# Patient Record
Sex: FEMALE | Race: WHITE | NOT HISPANIC OR LATINO | URBAN - METROPOLITAN AREA
[De-identification: names, ages, dates, MRNs, and addresses within clinical notes are randomized per-mention and may not be internally consistent; named-entity substitution may affect disease eponyms.]

---

## 2017-01-13 ENCOUNTER — OUTPATIENT (OUTPATIENT)
Dept: OUTPATIENT SERVICES | Facility: HOSPITAL | Age: 82
LOS: 1 days | End: 2017-01-13
Payer: COMMERCIAL

## 2017-01-13 DIAGNOSIS — Z98.89 OTHER SPECIFIED POSTPROCEDURAL STATES: Chronic | ICD-10-CM

## 2017-01-17 ENCOUNTER — OUTPATIENT (OUTPATIENT)
Dept: OUTPATIENT SERVICES | Facility: HOSPITAL | Age: 82
LOS: 1 days | End: 2017-01-17
Payer: MEDICARE

## 2017-01-17 DIAGNOSIS — Z98.89 OTHER SPECIFIED POSTPROCEDURAL STATES: Chronic | ICD-10-CM

## 2017-01-17 PROCEDURE — 88321 CONSLTJ&REPRT SLD PREP ELSWR: CPT

## 2017-01-17 PROCEDURE — 93010 ELECTROCARDIOGRAM REPORT: CPT

## 2017-01-19 ENCOUNTER — RESULT REVIEW (OUTPATIENT)
Age: 82
End: 2017-01-19

## 2017-01-20 ENCOUNTER — INPATIENT (INPATIENT)
Facility: HOSPITAL | Age: 82
LOS: 0 days | Discharge: HOME | DRG: 581 | End: 2017-01-21
Attending: SURGERY | Admitting: SURGERY
Payer: COMMERCIAL

## 2017-01-20 DIAGNOSIS — N81.10 CYSTOCELE, UNSPECIFIED: ICD-10-CM

## 2017-01-20 DIAGNOSIS — E78.5 HYPERLIPIDEMIA, UNSPECIFIED: ICD-10-CM

## 2017-01-20 DIAGNOSIS — C50.912 MALIGNANT NEOPLASM OF UNSPECIFIED SITE OF LEFT FEMALE BREAST: ICD-10-CM

## 2017-01-20 DIAGNOSIS — Z17.0 ESTROGEN RECEPTOR POSITIVE STATUS [ER+]: ICD-10-CM

## 2017-01-20 DIAGNOSIS — Z98.89 OTHER SPECIFIED POSTPROCEDURAL STATES: Chronic | ICD-10-CM

## 2017-01-20 DIAGNOSIS — I10 ESSENTIAL (PRIMARY) HYPERTENSION: ICD-10-CM

## 2017-01-20 DIAGNOSIS — M19.90 UNSPECIFIED OSTEOARTHRITIS, UNSPECIFIED SITE: ICD-10-CM

## 2017-01-20 DIAGNOSIS — C50.911 MALIGNANT NEOPLASM OF UNSPECIFIED SITE OF RIGHT FEMALE BREAST: ICD-10-CM

## 2017-01-20 DIAGNOSIS — M85.80 OTHER SPECIFIED DISORDERS OF BONE DENSITY AND STRUCTURE, UNSPECIFIED SITE: ICD-10-CM

## 2017-01-20 PROCEDURE — 88307 TISSUE EXAM BY PATHOLOGIST: CPT | Mod: 26

## 2017-01-20 PROCEDURE — 88360 TUMOR IMMUNOHISTOCHEM/MANUAL: CPT | Mod: 26

## 2017-01-21 PROCEDURE — 85025 COMPLETE CBC W/AUTO DIFF WBC: CPT

## 2017-01-21 PROCEDURE — G0463: CPT

## 2017-01-21 PROCEDURE — 86900 BLOOD TYPING SEROLOGIC ABO: CPT

## 2017-01-21 PROCEDURE — 36415 COLL VENOUS BLD VENIPUNCTURE: CPT

## 2017-01-21 PROCEDURE — 88307 TISSUE EXAM BY PATHOLOGIST: CPT

## 2017-01-21 PROCEDURE — 80048 BASIC METABOLIC PNL TOTAL CA: CPT

## 2017-01-21 PROCEDURE — 88360 TUMOR IMMUNOHISTOCHEM/MANUAL: CPT

## 2017-01-21 PROCEDURE — 86850 RBC ANTIBODY SCREEN: CPT

## 2017-01-21 PROCEDURE — 93005 ELECTROCARDIOGRAM TRACING: CPT

## 2017-12-19 ENCOUNTER — APPOINTMENT (OUTPATIENT)
Dept: ORTHOPEDIC SURGERY | Facility: CLINIC | Age: 82
End: 2017-12-19
Payer: MEDICARE

## 2017-12-19 PROCEDURE — 99213 OFFICE O/P EST LOW 20 MIN: CPT

## 2017-12-19 PROCEDURE — 72170 X-RAY EXAM OF PELVIS: CPT

## 2018-07-24 ENCOUNTER — APPOINTMENT (OUTPATIENT)
Dept: ORTHOPEDIC SURGERY | Facility: CLINIC | Age: 83
End: 2018-07-24
Payer: MEDICARE

## 2018-07-24 VITALS
WEIGHT: 153 LBS | DIASTOLIC BLOOD PRESSURE: 81 MMHG | HEART RATE: 67 BPM | SYSTOLIC BLOOD PRESSURE: 130 MMHG | BODY MASS INDEX: 24.59 KG/M2 | HEIGHT: 66 IN

## 2018-07-24 PROCEDURE — 73522 X-RAY EXAM HIPS BI 3-4 VIEWS: CPT

## 2018-07-24 PROCEDURE — 99213 OFFICE O/P EST LOW 20 MIN: CPT

## 2019-01-08 ENCOUNTER — APPOINTMENT (OUTPATIENT)
Dept: ORTHOPEDIC SURGERY | Facility: CLINIC | Age: 84
End: 2019-01-08
Payer: MEDICARE

## 2019-01-08 VITALS — SYSTOLIC BLOOD PRESSURE: 115 MMHG | DIASTOLIC BLOOD PRESSURE: 79 MMHG | HEART RATE: 75 BPM

## 2019-01-08 PROCEDURE — 99213 OFFICE O/P EST LOW 20 MIN: CPT

## 2019-01-08 NOTE — DISCUSSION/SUMMARY
[de-identified] : She does have significant osteoarthritis of her right hip.  She is managing with it and not having a major amount of pain.  Her left total hip replacement is doing very well.  She is managing well.  She will return in approximately 1 year but if she has any increased symptoms in her right hip she will return sooner.

## 2019-01-08 NOTE — PHYSICAL EXAM
[FreeTextEntry2] : this patient is doing very well with her left hip.\par \par The patient is doing a very well with her left total hip replacement her motion today shows flexion right hip 100 degrees left hip 120 degrees, abduction right hip 20 degrees left hip 60 degrees, adduction right hip 10 degrees left hip 20 degrees, external rotation right hip 20 degrees left hip 20 degrees, internal rotation right hip -10 degrees left hip 15 degrees.  She does have osteoarthritis now on her right hip but she feels that she is managing and not having a major amount of pain.  She would like to stay on conservative measures.  She has no problem with her left total hip replacement. \par \par

## 2019-01-08 NOTE — HISTORY OF PRESENT ILLNESS
[FreeTextEntry1] : L THR [FreeTextEntry2] : Pt is an 84 y/o female s/p L THR 1/9/15 doing well.  She is not having pain in her left hip.  She has no complaints about the left hip.  She continues to have intermittent pain in her right hip.  It is stiff.  She has some pain when she stands from sitting but it improves as she walks. [de-identified] : Pt is an 81 y/o female c/o right anterior thigh pain.  She states that the pain is worse when she sits on the couch at night.  When she stands up and walks the pain improves. Denies groin pain.  There are no other exacerbating factors.  \par She is s/p L THR 1/9/15 which is doing well.

## 2020-01-07 ENCOUNTER — APPOINTMENT (OUTPATIENT)
Dept: ORTHOPEDIC SURGERY | Facility: CLINIC | Age: 85
End: 2020-01-07
Payer: MEDICARE

## 2020-01-07 DIAGNOSIS — M16.11 UNILATERAL PRIMARY OSTEOARTHRITIS, RIGHT HIP: ICD-10-CM

## 2020-01-07 DIAGNOSIS — M17.12 UNILATERAL PRIMARY OSTEOARTHRITIS, LEFT KNEE: ICD-10-CM

## 2020-01-07 PROCEDURE — 99213 OFFICE O/P EST LOW 20 MIN: CPT | Mod: 25

## 2020-01-07 PROCEDURE — 20610 DRAIN/INJ JOINT/BURSA W/O US: CPT | Mod: LT

## 2020-01-07 PROCEDURE — 73522 X-RAY EXAM HIPS BI 3-4 VIEWS: CPT

## 2020-01-07 NOTE — PROCEDURE
[de-identified] : Procedure Note:\par \par Anatomic Location:  Left Knee\par \par Diagnosis:  Arthritis\par \par Procedure:  Injection of 2cc  of Marcaine 0.25% plain and Celestone 1cc, 6mg\par \par Local Spray: Ethyl Chloride.\par \par \par Patient has consented for the procedure.\par \par Injection  through a lateral parapatella approach.\par \par Patient tolerated the procedure well.\par \par Patient instructed to call the office if any reaction, fever, chills, increased erythema or swelling.   [phone].

## 2020-01-07 NOTE — PHYSICAL EXAM
[de-identified] : An AP of the pelvis and a lateral of the right and left hips shows a left hip as a Biomet all porous total hip replacement in good position and well fixed.  Her right hip shows near medial degenerative osteoarthritis no major change from before but there is bone against bone contact medially she is not having pain however in the this hip her pain is in her left knee. [FreeTextEntry2] : this patient is doing very well with her left hip.  Has excellent motion and she is having no pain.  Pain she is having is in her left knee.\par \par  her left total hip replacement her motion today shows flexion right hip 100 degrees left hip 120 degrees, abduction right hip 20 degrees left hip 60 degrees, adduction right hip 10 degrees left hip 20 degrees, external rotation right hip 20 degrees left hip 20 degrees, internal rotation right hip -10 degrees left hip 15 degrees.  \par \par She does have osteoarthritis now on her right hip but she feels that she is managing and not having a major amount of pain. \par \par \par From today is mainly with her left knee she has an effusion she did get an MRI but it was of the tibia and fibula and her ankle is giving her no pain.  She has good medial lateral and anterior posterior stability however she has an effusion her motion is going from 120 degrees of flexion to almost full extension she has discomfort over the medial joint line and some at the patellofemoral joint no Baker's cyst is present but an effusion is present her examination is consistent with arthritis. \par \par

## 2020-01-07 NOTE — DISCUSSION/SUMMARY
[de-identified] : She does have significant osteoarthritis of her right hip.  She is managing with it and not having a major amount of pain. \par \par Left knee does have significant osteoarthritis at this time we will follow continue with conservative measures.  She tolerated the local injection very well.  Return visit in 3 months. \par \par

## 2020-01-07 NOTE — HISTORY OF PRESENT ILLNESS
[de-identified] : Pt is an 85 y/o female s/p L THR 1/9/15 doing well. \par She is not having pain in her left hip. \par She has no complaints about the left hip. \par She continues to have intermittent pain in her right hip.\par It is stiff. She has some pain when she stands from sitting but it improves as she walks. \par Patient also mentions having insidious onset of left lower leg pain x 2 weeks. This pain is noticed more with getting up from a chair. She tried Tylenol for this pain, which hasn't helped. She has not iced her leg or performed stretching exercises. 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

## 2020-09-01 ENCOUNTER — APPOINTMENT (OUTPATIENT)
Dept: ORTHOPEDIC SURGERY | Facility: CLINIC | Age: 85
End: 2020-09-01
Payer: MEDICARE

## 2020-09-01 VITALS
HEART RATE: 64 BPM | WEIGHT: 148 LBS | BODY MASS INDEX: 23.78 KG/M2 | DIASTOLIC BLOOD PRESSURE: 79 MMHG | SYSTOLIC BLOOD PRESSURE: 132 MMHG | HEIGHT: 66 IN

## 2020-09-01 VITALS — TEMPERATURE: 97.7 F

## 2020-09-01 DIAGNOSIS — M51.37 OTHER INTERVERTEBRAL DISC DEGENERATION, LUMBOSACRAL REGION: ICD-10-CM

## 2020-09-01 PROCEDURE — 72100 X-RAY EXAM L-S SPINE 2/3 VWS: CPT

## 2020-09-01 PROCEDURE — 73522 X-RAY EXAM HIPS BI 3-4 VIEWS: CPT

## 2020-09-01 PROCEDURE — 99213 OFFICE O/P EST LOW 20 MIN: CPT | Mod: 25,95

## 2020-09-01 NOTE — PHYSICAL EXAM
[de-identified] : An AP of the pelvis and a lateral of the right and left hips shows a left hip as a Biomet all porous total hip replacement in good position and well fixed.  Right at this time continues to show the significant medial osteoarthritis.  There is not a major change from before there is definitely medial bone against bone contacting and medial or wear.  \par \par AP and lateral of the lumbar spine show generally her lumbar disc spaces are maintained but with some degeneration at L5-S1.  \par \par  [FreeTextEntry2] : She continues to do extremely well as far as her left hip.  Her motion is good and she has having no pain.  She does have some aching in her thigh but she is on a statin medication and because both thighs give her some aching she will discuss this also with her internal medicine doctor.  Does have the discomfort in her right hip however at this time she still is managing and does have the pain in her right thigh. \par Motion on today's exam show that she has flexion of 115 degrees on the right and 125 degrees on the left with abduction 45 degrees on the right and 50 degrees on the left adduction 10 degrees right 25 degrees on the left with external rotation of 65 degrees on the right and 10 degrees on the left and 80 and internal rotation of 15 degrees on the right and 20 degrees on the left.\par \par She is not having the pain on the left but she does have the discomfort on the her right.  She comes in with her daughter and at the present time she is still managing.  She does have osteoarthritis in the right hip and then the future may well benefit from right hip replacement but at this time she can continue to be on conservative measures.\par \par She also has occasional discomfort low back because of the pain going into her thighs x-ray will be done however it appears that her back is not the major component to her discomfort.  \par \par

## 2020-09-01 NOTE — HISTORY OF PRESENT ILLNESS
[Other Location: e.g. School (Enter Location, City,State)___] : at [unfilled], at the time of the visit. [Other Location: e.g. Home (Enter Location, City,State)___] : at [unfilled] [Family Member] : family member [Verbal consent obtained from patient] : the patient, [unfilled] [de-identified] : Pt is an 83 y/o female s/p L THR 1/9/15 doing well. \par She is not having pain in her left hip. \par She has no complaints about the left hip. \par She continues to have intermittent pain in her right hip.\par It is stiff. She has some pain when she stands from sitting, especially in her right thigh. Her left thigh also hurts at times.\par She says her lower back aches, but after she gets walking, this lower back pain subsides. Denies radicular pain or bowel/bladder dysfunction/saddle anesthesia.\par Patient also follows up for left knee pain due to OA. She received a cortisone injection last office visit on 1/7/20 which helped greatly. She has no pain with this knee currently.

## 2020-09-01 NOTE — DISCUSSION/SUMMARY
[de-identified] : This time the patient is managing with the osteoarthritis in her right hip.  I feel she would benefit from total hip replacement in the future she would like to return in January for reevaluation at this time however she may tie Celebrex to be taken once a day and that she is not taking an anti-inflammatory and return visit in 5 months.  She does have significant osteoarthritis of her right hip.  She is managing with it and not having a major amount of pain. \par \par \par

## 2021-01-11 NOTE — HISTORY OF PRESENT ILLNESS
[de-identified] : Pt is an 84 y/o female s/p L THR 1/9/15 doing well. \par She is not having pain in her left hip. \par She has no complaints about the left hip. \par She continues to have intermittent pain in her right hip.\par It is stiff. She has some pain when she stands from sitting, especially in her right thigh. Her left thigh also hurts at times.\par She says her lower back aches, but after she gets walking, this lower back pain subsides. Denies radicular pain or bowel/bladder dysfunction/saddle anesthesia.\par Patient also follows up for left knee pain due to OA. She received a cortisone injection last office visit on 1/7/20 which helped greatly. She has no pain with this knee currently.

## 2021-01-13 ENCOUNTER — APPOINTMENT (OUTPATIENT)
Dept: ORTHOPEDIC SURGERY | Facility: CLINIC | Age: 86
End: 2021-01-13

## 2021-03-23 ENCOUNTER — APPOINTMENT (OUTPATIENT)
Dept: ORTHOPEDIC SURGERY | Facility: CLINIC | Age: 86
End: 2021-03-23
Payer: MEDICARE

## 2021-03-23 DIAGNOSIS — M16.11 UNILATERAL PRIMARY OSTEOARTHRITIS, RIGHT HIP: ICD-10-CM

## 2021-03-23 PROCEDURE — 73522 X-RAY EXAM HIPS BI 3-4 VIEWS: CPT

## 2021-03-23 PROCEDURE — 99214 OFFICE O/P EST MOD 30 MIN: CPT | Mod: 95

## 2021-03-23 NOTE — HISTORY OF PRESENT ILLNESS
[de-identified] : Pt is an 84 y/o female s/p L THR 1/9/15 doing well. \par She is not having pain in her left hip. \par She has no complaints about the left hip. \par She continues to have intermittent pain in her right hip.\par It is stiff. She has some pain when she stands from sitting, especially in her right thigh.\par She says her lower back aches, but after she gets walking, this lower back pain subsides. Denies radicular pain or bowel/bladder dysfunction/saddle anesthesia.\par She has not been taking any medicine for pain.

## 2021-03-23 NOTE — PHYSICAL EXAM
[de-identified] : An AP of the pelvis and a lateral of the right and left hips were done.  The left hip is a Biomet porous metal on polyethylene total hip replacement in good position and well fixed there is no evidence of loosening there is no evidence of osteolysis.    Shows a left hip as a Biomet all porous total hip replacement in good position and well fixed. \par \par The right hip at this time shows severe osteoarthritis with marked loss of cartilage early protrusio and degeneration throughout the joint mainly medially. \par \par \par \par \par \par  [FreeTextEntry2] : Her left hip is doing very well.  She continues to walk and having no significant pain.  Her ambulation is without any complaints on her left side however she is constantly having pain in her right groin area and the right thigh area and pain when she tries to move her hip.  This has not been in proving.  It does appear to be coming from her hip.  \par \par Her range of motion on today's exam shows flexion of 115 degrees on the right and 125 degrees on the left with abduction 40 degrees on the right and 50 degrees on the left,  adduction 10 degrees right 25 degrees on the left with external rotation of 40 degrees on the right and 50 degrees on the left,  and  internal rotation of 10 degrees on the right and 20 degrees on the left.  She has a pain in her right hip with walking and trying to go through range of motion on the right side.\par \par She is not having the pain on the left but she does have the discomfort on the her right.  She comes in with her daughter and at the present time she is still managing.  She does have osteoarthritis in the right hip and then the future may well benefit from right hip replacement but at this time she can continue to be on conservative measures.\par \par She also has occasional discomfort low back because of the pain going into her thighs x-ray will be done however it appears that her back is not the major component to her discomfort.  \par \par

## 2021-03-23 NOTE — DISCUSSION/SUMMARY
[de-identified] : A long discussion was had with both the patient and her son.  At this time she is becoming more limited because of her right hip.  Walking gives her pain and any activity gives her pain.  The anti-inflammatory medicines now are not helping her significantly.  I do feel at this time a total hip replacement would be very beneficial.  Her general medical health is good according to her family.  A long discussion with the patient's son about the hospitalization and rehabilitation was done.  She could go to to her daughter her son's house postoperatively where could go home but she lives alone.  There is also the option of the rehab facility and her walking is good.  The risk and benefits were discussed. \par \par  The natural history and treatment of degenerative arthritis was discussed with the patient at length today.  The spectrum of the treatment including nonoperative modalities to surgical intervention was elucidated.  Noninvasive and nonoperative treatment modalities include weight reduction, activity modification with low impact exercise, needed use of Tylenol or anti-inflammatory medications if tolerated, glucosamine and chondroitin supplement, and physical therapy.  In some cases the further treatment can include corticosteroid injections and the use of Visco supplementation with hyaluronic acid injections.  Definite surgical treatment can certainly include total joint arthroplasty also.  The risk and benefits of each treatment option was discussed and questions were answered.\par \par  A  long discussion was had with the patient as what the total joint replacement would entail.  A model was used as an educational tool to demonstrate the operation.    We did discuss implant choice and fixation, cemented or porous ingrowth, and stability concerns.  Shared decision making was made with the patient. The hospitalization and rehabilitation were discussed.  The uses of perioperative antibiotics and DVT prophylaxis were discussed.   The risks, benefits and alternatives to surgical intervention were discussed at length with the patient. Specific risks discussed included: infection, wound breakdown, numbness and damage to nerves, tendon, muscle, arteries or other blood vessels.  The possibility of recurrent pain, no improvement in pain and actual worsening of the pain were also mentioned in conversation with the patient. Medical complications related to the patient's general medical health including deep vein thrombosis, pulmonary embolus, heart attack, stroke, death and other complications from anesthesia were addressed. The patient was told that we will take steps to minimize these risks by using sterile technique, antibiotics and DVT prophylaxis when appropriate and following the patient postoperatively. The benefits of surgery were discussed with the patient including the potential to improve the current clinical condition throughout operative intervention. Alternatives to surgical intervention include continued conservative management which may yield less than optimal results this particular patient. Questions were answered to the satisfaction of the patient.\par \par In this individual the additional risk factors due to their medical conditions were discussed.\par \par Orthopedic decision making #1 I feel that the patient is now having considerable problems with her right hip and would greatly benefit from right total hip replacement.  She is doing extremely well with her left hip and is being limited in her ambulation and activities because of the arthritis on the right\par \par The total time in this encounter with the patient, including review of the records reviewing x-rays and seeing the patient,  was over 35 minutes \par \par \par

## 2021-03-23 NOTE — REASON FOR VISIT
[Follow-Up Visit] : a follow-up visit for [Other Location: e.g. School (Enter Location, City,State)___] : at [unfilled], at the time of the visit. [Other Location: e.g. Home (Enter Location, City,State)___] : at [unfilled] [Family Member] : family member [Verbal consent obtained from patient] : the patient, [unfilled] [FreeTextEntry2] : left THR; right hip pain

## 2021-03-26 ENCOUNTER — OUTPATIENT (OUTPATIENT)
Dept: OUTPATIENT SERVICES | Facility: HOSPITAL | Age: 86
LOS: 1 days | End: 2021-03-26
Payer: MEDICARE

## 2021-03-26 VITALS
HEIGHT: 65 IN | SYSTOLIC BLOOD PRESSURE: 163 MMHG | OXYGEN SATURATION: 98 % | HEART RATE: 70 BPM | TEMPERATURE: 98 F | DIASTOLIC BLOOD PRESSURE: 91 MMHG | RESPIRATION RATE: 18 BRPM | WEIGHT: 149.91 LBS

## 2021-03-26 DIAGNOSIS — Z98.89 OTHER SPECIFIED POSTPROCEDURAL STATES: Chronic | ICD-10-CM

## 2021-03-26 DIAGNOSIS — M19.90 UNSPECIFIED OSTEOARTHRITIS, UNSPECIFIED SITE: ICD-10-CM

## 2021-03-26 DIAGNOSIS — I10 ESSENTIAL (PRIMARY) HYPERTENSION: ICD-10-CM

## 2021-03-26 DIAGNOSIS — Z90.710 ACQUIRED ABSENCE OF BOTH CERVIX AND UTERUS: Chronic | ICD-10-CM

## 2021-03-26 DIAGNOSIS — M16.11 UNILATERAL PRIMARY OSTEOARTHRITIS, RIGHT HIP: ICD-10-CM

## 2021-03-26 DIAGNOSIS — Z01.818 ENCOUNTER FOR OTHER PREPROCEDURAL EXAMINATION: ICD-10-CM

## 2021-03-26 DIAGNOSIS — Z29.9 ENCOUNTER FOR PROPHYLACTIC MEASURES, UNSPECIFIED: ICD-10-CM

## 2021-03-26 DIAGNOSIS — Z90.13 ACQUIRED ABSENCE OF BILATERAL BREASTS AND NIPPLES: Chronic | ICD-10-CM

## 2021-03-26 DIAGNOSIS — Z96.642 PRESENCE OF LEFT ARTIFICIAL HIP JOINT: Chronic | ICD-10-CM

## 2021-03-26 LAB
ANION GAP SERPL CALC-SCNC: 14 MMOL/L — SIGNIFICANT CHANGE UP (ref 5–17)
BLD GP AB SCN SERPL QL: NEGATIVE — SIGNIFICANT CHANGE UP
BUN SERPL-MCNC: 14 MG/DL — SIGNIFICANT CHANGE UP (ref 7–23)
CALCIUM SERPL-MCNC: 9.3 MG/DL — SIGNIFICANT CHANGE UP (ref 8.4–10.5)
CHLORIDE SERPL-SCNC: 99 MMOL/L — SIGNIFICANT CHANGE UP (ref 96–108)
CO2 SERPL-SCNC: 23 MMOL/L — SIGNIFICANT CHANGE UP (ref 22–31)
CREAT SERPL-MCNC: 0.61 MG/DL — SIGNIFICANT CHANGE UP (ref 0.5–1.3)
GLUCOSE SERPL-MCNC: 91 MG/DL — SIGNIFICANT CHANGE UP (ref 70–99)
HCT VFR BLD CALC: 38.2 % — SIGNIFICANT CHANGE UP (ref 34.5–45)
HGB BLD-MCNC: 12.5 G/DL — SIGNIFICANT CHANGE UP (ref 11.5–15.5)
MCHC RBC-ENTMCNC: 30.4 PG — SIGNIFICANT CHANGE UP (ref 27–34)
MCHC RBC-ENTMCNC: 32.7 GM/DL — SIGNIFICANT CHANGE UP (ref 32–36)
MCV RBC AUTO: 92.9 FL — SIGNIFICANT CHANGE UP (ref 80–100)
NRBC # BLD: 0 /100 WBCS — SIGNIFICANT CHANGE UP (ref 0–0)
PLATELET # BLD AUTO: 346 K/UL — SIGNIFICANT CHANGE UP (ref 150–400)
POTASSIUM SERPL-MCNC: 3.8 MMOL/L — SIGNIFICANT CHANGE UP (ref 3.5–5.3)
POTASSIUM SERPL-SCNC: 3.8 MMOL/L — SIGNIFICANT CHANGE UP (ref 3.5–5.3)
RBC # BLD: 4.11 M/UL — SIGNIFICANT CHANGE UP (ref 3.8–5.2)
RBC # FLD: 13.1 % — SIGNIFICANT CHANGE UP (ref 10.3–14.5)
RH IG SCN BLD-IMP: POSITIVE — SIGNIFICANT CHANGE UP
SODIUM SERPL-SCNC: 136 MMOL/L — SIGNIFICANT CHANGE UP (ref 135–145)
WBC # BLD: 9.41 K/UL — SIGNIFICANT CHANGE UP (ref 3.8–10.5)
WBC # FLD AUTO: 9.41 K/UL — SIGNIFICANT CHANGE UP (ref 3.8–10.5)

## 2021-03-26 PROCEDURE — 85027 COMPLETE CBC AUTOMATED: CPT

## 2021-03-26 PROCEDURE — 87640 STAPH A DNA AMP PROBE: CPT

## 2021-03-26 PROCEDURE — 86850 RBC ANTIBODY SCREEN: CPT

## 2021-03-26 PROCEDURE — G0463: CPT

## 2021-03-26 PROCEDURE — 86901 BLOOD TYPING SEROLOGIC RH(D): CPT

## 2021-03-26 PROCEDURE — 87641 MR-STAPH DNA AMP PROBE: CPT

## 2021-03-26 PROCEDURE — 80048 BASIC METABOLIC PNL TOTAL CA: CPT

## 2021-03-26 PROCEDURE — 83036 HEMOGLOBIN GLYCOSYLATED A1C: CPT

## 2021-03-26 PROCEDURE — 86900 BLOOD TYPING SEROLOGIC ABO: CPT

## 2021-03-26 RX ORDER — CEFAZOLIN SODIUM 1 G
2000 VIAL (EA) INJECTION ONCE
Refills: 0 | Status: DISCONTINUED | OUTPATIENT
Start: 2021-04-09 | End: 2021-04-12

## 2021-03-26 NOTE — H&P PST ADULT - NSANTHOSAYNRD_GEN_A_CORE
No. JAYLA screening performed.  STOP BANG Legend: 0-2 = LOW Risk; 3-4 = INTERMEDIATE Risk; 5-8 = HIGH Risk

## 2021-03-26 NOTE — H&P PST ADULT - ASSESSMENT

## 2021-03-26 NOTE — H&P PST ADULT - ABILITY TO HEAR (WITH HEARING AID OR HEARING APPLIANCE IF NORMALLY USED):
Mildly to Moderately Impaired: difficulty hearing in some environments or speaker may need to increase volume or speak distinctly Uses hearing aid/Mildly to Moderately Impaired: difficulty hearing in some environments or speaker may need to increase volume or speak distinctly

## 2021-03-26 NOTE — H&P PST ADULT - HISTORY OF PRESENT ILLNESS
This is a 85 year old female with past medical history of HTN, HLD, OA s/p left hip total replacement (2015).           Pt is now presenting to PST for scheduled Right total hip replacement on 4/9/21 with Dr. Vuong.    **Covid test on   This is a 85 year old female with past medical history of HTN, HLD, OA s/p left hip total replacement (2015), Breast Cancer S/P bilateral mastectomy (2017)    Reports having pain in the Right hip radiating down to the upper thigh for months. Occassionally uses cane for support during ambulation      Pt is now presenting to PST for scheduled Right total hip replacement on 4/9/21 with Dr. Vuong.    **Covid test on   This is a 85 year old female with past medical history of HTN, HLD, OA s/p left hip total replacement (2015), Breast Cancer S/P bilateral mastectomy (2017)    Reports having pain in the Right hip radiating down to the upper thigh for months. Occassionally uses cane for support during ambulation. Pt is now presenting to [facility] for scheduled Right total hip replacement on 4/9/21 with Dr. Vuong. Pt adivsed to obtain medical clearance     **Covid test on  4/6/21 @ [facility] This is a 85 year old female with past medical history of HTN, HLD, OA s/p left hip total replacement (2015), Breast Cancer S/P chemotherapy and bilateral mastectomies (2017)  Reports having pain in the Right hip radiating down to the upper thigh for months. Occasionally uses cane for support during ambulation. Pt is now presenting to [facility] for scheduled Right total hip replacement on 4/9/21 with Dr. Vuong. Pt advised to obtain medical clearance     **Covid test on  4/6/21 @ [facility]

## 2021-03-26 NOTE — H&P PST ADULT - NSICDXPROBLEM_GEN_ALL_CORE_FT
PROBLEM DIAGNOSES  Problem: OA (osteoarthritis)  Assessment and Plan: Scheduled for Right total hip replacement on 4/9/21 with Dr. Vuong  Preop instructions and chlorohexidine soap provided  Labs CBC BMP A1C type and screeen & MRSA performed in PST   Covid test on 4/6 @ [facility]    Problem: HTN (hypertension)  Assessment and Plan: Pt instructed to take BP meds with sip of water on DOS    Problem: Need for prophylactic measure  Assessment and Plan: The Caprini score indicates this patient is at risk for a VTE event (score 3-5).  Most surgical patients in this group would benefit from pharmacologic prophylaxis.  The surgical team will determine the balance between VTE risk and bleeding risk

## 2021-03-26 NOTE — H&P PST ADULT - HEALTH CARE MAINTENANCE
Follows up PCP Follows up PCP regularly  Covid vaccinated Follows up PCP, Cards and Onc every 6 months  Covid vaccinated

## 2021-03-26 NOTE — H&P PST ADULT - NSICDXPASTSURGICALHX_GEN_ALL_CORE_FT
PAST SURGICAL HISTORY:  H/O breast biopsy multiple all benign, except for the one in 2009    History of left hip replacement 2015    S/P breast lumpectomy left breast with sentinal node biopsy 2009     PAST SURGICAL HISTORY:  H/O breast biopsy multiple all benign, except for the one in 2009    H/O total hysterectomy 2020    History of bilateral mastectomy 2017    History of left hip replacement 2015    S/P breast lumpectomy left breast with sentinal node biopsy 2009

## 2021-03-26 NOTE — H&P PST ADULT - NSICDXPASTMEDICALHX_GEN_ALL_CORE_FT
PAST MEDICAL HISTORY:  Breast cancer left treated with lumpectomy and radiation in 2002    HLD (hyperlipidemia)     HTN (hypertension)     OA (osteoarthritis)

## 2021-03-27 LAB
A1C WITH ESTIMATED AVERAGE GLUCOSE RESULT: 5.7 % — HIGH (ref 4–5.6)
ESTIMATED AVERAGE GLUCOSE: 117 MG/DL — HIGH (ref 68–114)
MRSA PCR RESULT.: SIGNIFICANT CHANGE UP
S AUREUS DNA NOSE QL NAA+PROBE: SIGNIFICANT CHANGE UP

## 2021-04-06 ENCOUNTER — OUTPATIENT (OUTPATIENT)
Dept: OUTPATIENT SERVICES | Facility: HOSPITAL | Age: 86
LOS: 1 days | End: 2021-04-06

## 2021-04-06 DIAGNOSIS — Z90.710 ACQUIRED ABSENCE OF BOTH CERVIX AND UTERUS: Chronic | ICD-10-CM

## 2021-04-06 DIAGNOSIS — Z98.89 OTHER SPECIFIED POSTPROCEDURAL STATES: Chronic | ICD-10-CM

## 2021-04-06 DIAGNOSIS — Z96.642 PRESENCE OF LEFT ARTIFICIAL HIP JOINT: Chronic | ICD-10-CM

## 2021-04-06 DIAGNOSIS — Z11.52 ENCOUNTER FOR SCREENING FOR COVID-19: ICD-10-CM

## 2021-04-06 DIAGNOSIS — Z90.13 ACQUIRED ABSENCE OF BILATERAL BREASTS AND NIPPLES: Chronic | ICD-10-CM

## 2021-04-06 LAB — SARS-COV-2 RNA SPEC QL NAA+PROBE: SIGNIFICANT CHANGE UP

## 2021-04-08 ENCOUNTER — TRANSCRIPTION ENCOUNTER (OUTPATIENT)
Age: 86
End: 2021-04-08

## 2021-04-09 ENCOUNTER — INPATIENT (INPATIENT)
Facility: HOSPITAL | Age: 86
LOS: 2 days | Discharge: NURSING, REHAB OR LONG-TERM CARE | DRG: 470 | End: 2021-04-12
Attending: ORTHOPAEDIC SURGERY | Admitting: ORTHOPAEDIC SURGERY
Payer: MEDICARE

## 2021-04-09 ENCOUNTER — APPOINTMENT (OUTPATIENT)
Dept: ORTHOPEDIC SURGERY | Facility: HOSPITAL | Age: 86
End: 2021-04-09

## 2021-04-09 ENCOUNTER — RESULT REVIEW (OUTPATIENT)
Age: 86
End: 2021-04-09

## 2021-04-09 VITALS
RESPIRATION RATE: 16 BRPM | WEIGHT: 143.3 LBS | HEART RATE: 68 BPM | HEIGHT: 65 IN | DIASTOLIC BLOOD PRESSURE: 90 MMHG | TEMPERATURE: 98 F | OXYGEN SATURATION: 98 % | SYSTOLIC BLOOD PRESSURE: 146 MMHG

## 2021-04-09 DIAGNOSIS — M16.11 UNILATERAL PRIMARY OSTEOARTHRITIS, RIGHT HIP: ICD-10-CM

## 2021-04-09 DIAGNOSIS — Z98.89 OTHER SPECIFIED POSTPROCEDURAL STATES: Chronic | ICD-10-CM

## 2021-04-09 DIAGNOSIS — Z96.642 PRESENCE OF LEFT ARTIFICIAL HIP JOINT: Chronic | ICD-10-CM

## 2021-04-09 DIAGNOSIS — Z90.13 ACQUIRED ABSENCE OF BILATERAL BREASTS AND NIPPLES: Chronic | ICD-10-CM

## 2021-04-09 DIAGNOSIS — Z90.710 ACQUIRED ABSENCE OF BOTH CERVIX AND UTERUS: Chronic | ICD-10-CM

## 2021-04-09 LAB — GLUCOSE BLDC GLUCOMTR-MCNC: 102 MG/DL — HIGH (ref 70–99)

## 2021-04-09 PROCEDURE — 88305 TISSUE EXAM BY PATHOLOGIST: CPT | Mod: 26

## 2021-04-09 PROCEDURE — 27130 TOTAL HIP ARTHROPLASTY: CPT | Mod: RT

## 2021-04-09 PROCEDURE — 88311 DECALCIFY TISSUE: CPT | Mod: 26

## 2021-04-09 PROCEDURE — 73502 X-RAY EXAM HIP UNI 2-3 VIEWS: CPT | Mod: 26,RT

## 2021-04-09 RX ORDER — SODIUM CHLORIDE 9 MG/ML
1000 INJECTION, SOLUTION INTRAVENOUS
Refills: 0 | Status: DISCONTINUED | OUTPATIENT
Start: 2021-04-09 | End: 2021-04-09

## 2021-04-09 RX ORDER — SODIUM CHLORIDE 9 MG/ML
500 INJECTION, SOLUTION INTRAVENOUS ONCE
Refills: 0 | Status: COMPLETED | OUTPATIENT
Start: 2021-04-09 | End: 2021-04-09

## 2021-04-09 RX ORDER — METOPROLOL TARTRATE 50 MG
75 TABLET ORAL
Qty: 0 | Refills: 0 | DISCHARGE

## 2021-04-09 RX ORDER — ACETAMINOPHEN 500 MG
1000 TABLET ORAL ONCE
Refills: 0 | Status: COMPLETED | OUTPATIENT
Start: 2021-04-10 | End: 2021-04-10

## 2021-04-09 RX ORDER — HYDROMORPHONE HYDROCHLORIDE 2 MG/ML
0.5 INJECTION INTRAMUSCULAR; INTRAVENOUS; SUBCUTANEOUS
Refills: 0 | Status: DISCONTINUED | OUTPATIENT
Start: 2021-04-09 | End: 2021-04-09

## 2021-04-09 RX ORDER — RAMIPRIL 5 MG
1 CAPSULE ORAL
Qty: 0 | Refills: 0 | DISCHARGE

## 2021-04-09 RX ORDER — DEXAMETHASONE 0.5 MG/5ML
8 ELIXIR ORAL ONCE
Refills: 0 | Status: COMPLETED | OUTPATIENT
Start: 2021-04-10 | End: 2021-04-10

## 2021-04-09 RX ORDER — ASPIRIN/CALCIUM CARB/MAGNESIUM 324 MG
325 TABLET ORAL
Refills: 0 | Status: DISCONTINUED | OUTPATIENT
Start: 2021-04-09 | End: 2021-04-12

## 2021-04-09 RX ORDER — ONDANSETRON 8 MG/1
4 TABLET, FILM COATED ORAL EVERY 6 HOURS
Refills: 0 | Status: DISCONTINUED | OUTPATIENT
Start: 2021-04-09 | End: 2021-04-12

## 2021-04-09 RX ORDER — ANASTROZOLE 1 MG/1
1 TABLET ORAL DAILY
Refills: 0 | Status: DISCONTINUED | OUTPATIENT
Start: 2021-04-09 | End: 2021-04-12

## 2021-04-09 RX ORDER — SODIUM CHLORIDE 9 MG/ML
1000 INJECTION, SOLUTION INTRAVENOUS
Refills: 0 | Status: DISCONTINUED | OUTPATIENT
Start: 2021-04-09 | End: 2021-04-12

## 2021-04-09 RX ORDER — LEVOTHYROXINE SODIUM 125 MCG
50 TABLET ORAL DAILY
Refills: 0 | Status: DISCONTINUED | OUTPATIENT
Start: 2021-04-09 | End: 2021-04-12

## 2021-04-09 RX ORDER — CHLORHEXIDINE GLUCONATE 213 G/1000ML
1 SOLUTION TOPICAL ONCE
Refills: 0 | Status: DISCONTINUED | OUTPATIENT
Start: 2021-04-09 | End: 2021-04-09

## 2021-04-09 RX ORDER — POLYETHYLENE GLYCOL 3350 17 G/17G
17 POWDER, FOR SOLUTION ORAL AT BEDTIME
Refills: 0 | Status: DISCONTINUED | OUTPATIENT
Start: 2021-04-09 | End: 2021-04-12

## 2021-04-09 RX ORDER — SODIUM CHLORIDE 9 MG/ML
3 INJECTION INTRAMUSCULAR; INTRAVENOUS; SUBCUTANEOUS EVERY 8 HOURS
Refills: 0 | Status: DISCONTINUED | OUTPATIENT
Start: 2021-04-09 | End: 2021-04-12

## 2021-04-09 RX ORDER — ACETAMINOPHEN 500 MG
975 TABLET ORAL EVERY 8 HOURS
Refills: 0 | Status: DISCONTINUED | OUTPATIENT
Start: 2021-04-10 | End: 2021-04-12

## 2021-04-09 RX ORDER — SODIUM CHLORIDE 9 MG/ML
500 INJECTION INTRAMUSCULAR; INTRAVENOUS; SUBCUTANEOUS ONCE
Refills: 0 | Status: COMPLETED | OUTPATIENT
Start: 2021-04-09 | End: 2021-04-09

## 2021-04-09 RX ORDER — METOPROLOL TARTRATE 50 MG
75 TABLET ORAL EVERY 12 HOURS
Refills: 0 | Status: DISCONTINUED | OUTPATIENT
Start: 2021-04-09 | End: 2021-04-12

## 2021-04-09 RX ORDER — PANTOPRAZOLE SODIUM 20 MG/1
40 TABLET, DELAYED RELEASE ORAL ONCE
Refills: 0 | Status: COMPLETED | OUTPATIENT
Start: 2021-04-09 | End: 2021-04-09

## 2021-04-09 RX ORDER — LEVOTHYROXINE SODIUM 125 MCG
1 TABLET ORAL
Qty: 0 | Refills: 0 | DISCHARGE

## 2021-04-09 RX ORDER — PANTOPRAZOLE SODIUM 20 MG/1
40 TABLET, DELAYED RELEASE ORAL
Refills: 0 | Status: DISCONTINUED | OUTPATIENT
Start: 2021-04-09 | End: 2021-04-12

## 2021-04-09 RX ORDER — HYDROMORPHONE HYDROCHLORIDE 2 MG/ML
0.5 INJECTION INTRAMUSCULAR; INTRAVENOUS; SUBCUTANEOUS ONCE
Refills: 0 | Status: DISCONTINUED | OUTPATIENT
Start: 2021-04-09 | End: 2021-04-12

## 2021-04-09 RX ORDER — ACETAMINOPHEN 500 MG
1000 TABLET ORAL ONCE
Refills: 0 | Status: DISCONTINUED | OUTPATIENT
Start: 2021-04-09 | End: 2021-04-12

## 2021-04-09 RX ORDER — FENTANYL CITRATE 50 UG/ML
25 INJECTION INTRAVENOUS
Refills: 0 | Status: DISCONTINUED | OUTPATIENT
Start: 2021-04-09 | End: 2021-04-09

## 2021-04-09 RX ORDER — LIDOCAINE HCL 20 MG/ML
0.2 VIAL (ML) INJECTION ONCE
Refills: 0 | Status: DISCONTINUED | OUTPATIENT
Start: 2021-04-09 | End: 2021-04-09

## 2021-04-09 RX ORDER — CEFAZOLIN SODIUM 1 G
2000 VIAL (EA) INJECTION EVERY 8 HOURS
Refills: 0 | Status: COMPLETED | OUTPATIENT
Start: 2021-04-09 | End: 2021-04-10

## 2021-04-09 RX ORDER — CELECOXIB 200 MG/1
200 CAPSULE ORAL EVERY 12 HOURS
Refills: 0 | Status: DISCONTINUED | OUTPATIENT
Start: 2021-04-10 | End: 2021-04-12

## 2021-04-09 RX ORDER — SIMVASTATIN 20 MG/1
20 TABLET, FILM COATED ORAL AT BEDTIME
Refills: 0 | Status: DISCONTINUED | OUTPATIENT
Start: 2021-04-09 | End: 2021-04-12

## 2021-04-09 RX ORDER — OXYCODONE HYDROCHLORIDE 5 MG/1
2.5 TABLET ORAL
Refills: 0 | Status: DISCONTINUED | OUTPATIENT
Start: 2021-04-09 | End: 2021-04-12

## 2021-04-09 RX ORDER — SODIUM CHLORIDE 9 MG/ML
500 INJECTION, SOLUTION INTRAVENOUS ONCE
Refills: 0 | Status: COMPLETED | OUTPATIENT
Start: 2021-04-10 | End: 2021-04-10

## 2021-04-09 RX ORDER — TRAMADOL HYDROCHLORIDE 50 MG/1
50 TABLET ORAL EVERY 6 HOURS
Refills: 0 | Status: DISCONTINUED | OUTPATIENT
Start: 2021-04-09 | End: 2021-04-12

## 2021-04-09 RX ORDER — LISINOPRIL 2.5 MG/1
40 TABLET ORAL DAILY
Refills: 0 | Status: DISCONTINUED | OUTPATIENT
Start: 2021-04-10 | End: 2021-04-12

## 2021-04-09 RX ORDER — SODIUM CHLORIDE 9 MG/ML
3 INJECTION INTRAMUSCULAR; INTRAVENOUS; SUBCUTANEOUS EVERY 8 HOURS
Refills: 0 | Status: DISCONTINUED | OUTPATIENT
Start: 2021-04-09 | End: 2021-04-09

## 2021-04-09 RX ORDER — ACETAMINOPHEN 500 MG
1000 TABLET ORAL ONCE
Refills: 0 | Status: COMPLETED | OUTPATIENT
Start: 2021-04-09 | End: 2021-04-09

## 2021-04-09 RX ORDER — ANASTROZOLE 1 MG/1
1 TABLET ORAL
Qty: 0 | Refills: 0 | DISCHARGE

## 2021-04-09 RX ORDER — OXYCODONE HYDROCHLORIDE 5 MG/1
5 TABLET ORAL
Refills: 0 | Status: DISCONTINUED | OUTPATIENT
Start: 2021-04-09 | End: 2021-04-12

## 2021-04-09 RX ORDER — TRAMADOL HYDROCHLORIDE 50 MG/1
50 TABLET ORAL ONCE
Refills: 0 | Status: DISCONTINUED | OUTPATIENT
Start: 2021-04-09 | End: 2021-04-09

## 2021-04-09 RX ADMIN — POLYETHYLENE GLYCOL 3350 17 GRAM(S): 17 POWDER, FOR SOLUTION ORAL at 22:01

## 2021-04-09 RX ADMIN — Medication 400 MILLIGRAM(S): at 18:19

## 2021-04-09 RX ADMIN — SODIUM CHLORIDE 100 MILLILITER(S): 9 INJECTION, SOLUTION INTRAVENOUS at 12:08

## 2021-04-09 RX ADMIN — SIMVASTATIN 20 MILLIGRAM(S): 20 TABLET, FILM COATED ORAL at 22:01

## 2021-04-09 RX ADMIN — SODIUM CHLORIDE 3 MILLILITER(S): 9 INJECTION INTRAMUSCULAR; INTRAVENOUS; SUBCUTANEOUS at 15:42

## 2021-04-09 RX ADMIN — Medication 1000 MILLIGRAM(S): at 19:41

## 2021-04-09 RX ADMIN — SODIUM CHLORIDE 100 MILLILITER(S): 9 INJECTION, SOLUTION INTRAVENOUS at 16:30

## 2021-04-09 RX ADMIN — TRAMADOL HYDROCHLORIDE 50 MILLIGRAM(S): 50 TABLET ORAL at 07:12

## 2021-04-09 RX ADMIN — Medication 325 MILLIGRAM(S): at 18:18

## 2021-04-09 RX ADMIN — Medication 100 MILLIGRAM(S): at 16:32

## 2021-04-09 RX ADMIN — Medication 150 MILLIGRAM(S): at 07:12

## 2021-04-09 RX ADMIN — PANTOPRAZOLE SODIUM 40 MILLIGRAM(S): 20 TABLET, DELAYED RELEASE ORAL at 07:12

## 2021-04-09 RX ADMIN — SODIUM CHLORIDE 1000 MILLILITER(S): 9 INJECTION, SOLUTION INTRAVENOUS at 16:32

## 2021-04-09 RX ADMIN — SODIUM CHLORIDE 3 MILLILITER(S): 9 INJECTION INTRAMUSCULAR; INTRAVENOUS; SUBCUTANEOUS at 21:57

## 2021-04-09 RX ADMIN — SODIUM CHLORIDE 1000 MILLILITER(S): 9 INJECTION, SOLUTION INTRAVENOUS at 10:44

## 2021-04-09 RX ADMIN — SODIUM CHLORIDE 1000 MILLILITER(S): 9 INJECTION INTRAMUSCULAR; INTRAVENOUS; SUBCUTANEOUS at 22:01

## 2021-04-09 RX ADMIN — ANASTROZOLE 1 MILLIGRAM(S): 1 TABLET ORAL at 18:18

## 2021-04-09 NOTE — PRE-ANESTHESIA EVALUATION ADULT - HEIGHT IN FEET
CM received HH orders and a walker order.  Home health orders sent to [facility] via [facility].  CM spoke to patient about order for walker to use at home.  Patient states he does not need a walker and does not want a walker.     5

## 2021-04-09 NOTE — PHYSICAL THERAPY INITIAL EVALUATION ADULT - MANUAL MUSCLE TESTING RESULTS, REHAB EVAL
BUEs grossly assessed to ~3+/5, LLE grossly assessed to ~3+/5, RLE grossly assessed to ~3/5/grossly assessed due to

## 2021-04-09 NOTE — PHYSICAL THERAPY INITIAL EVALUATION ADULT - BALANCE TRAINING, PT EVAL
GOAL: Pt will demonstrate improved static/dynamic standing balance by one grade in order to improve stability, decrease fall risk and increase independence with ADLs within 2 weeks.

## 2021-04-09 NOTE — PROGRESS NOTE ADULT - SUBJECTIVE AND OBJECTIVE BOX
Ortho post op check     86 y/o F postop from [facility], pt states she is feeling well and has no pain.       ICU Vital Signs Last 24 Hrs  T(C): 36.5 (09 Apr 2021 12:15), Max: 36.7 (09 Apr 2021 05:25)  T(F): 97.7 (09 Apr 2021 12:15), Max: 98.1 (09 Apr 2021 05:25)  HR: 71 (09 Apr 2021 12:45) (59 - 79)  BP: 103/58 (09 Apr 2021 12:30) (100/57 - 146/90)  BP(mean): 73 (09 Apr 2021 12:30) (73 - 83)  RR: 16 (09 Apr 2021 12:30) (16 - 18)  SpO2: 98% (09 Apr 2021 12:45) (96% - 100%)      Physical exam  General: in no acute distress, A&O x3  CV: S1/S2, RRR, no murmurs, rubs or gallops  Pulm: Clear to auscultation b/l anterior chest wall   Abdomen: non-tender, non-distended, normoactive BS   MSK: RLE       -Aquacel on right hip, clean and dry      -Abductor cushion in place      -5/5 strength DF/PF/EHL/FHL       -sensation intact light touch      -2+ DP and PT pulses, cap refill <2 secs       -comparments are soft, no calf tenderness

## 2021-04-09 NOTE — PHYSICAL THERAPY INITIAL EVALUATION ADULT - STRENGTHENING, PT EVAL
GOAL: Pt will improve RLE strength by one MMT grade for increased limb stability, to improve gait and facilitate stair negotiation in 2 weeks.

## 2021-04-09 NOTE — PHYSICAL THERAPY INITIAL EVALUATION ADULT - ADDITIONAL COMMENTS
Pt lives alone in a condo with 15 steps up to floor. with As per chart review, pt used cane occasionally with amb. Pt reports she was amb (I) without an AD and (I) with all ADLs, + PTA. Pt reports that daughter is able to assist her as needed.

## 2021-04-09 NOTE — PHYSICAL THERAPY INITIAL EVALUATION ADULT - GAIT DEVIATIONS NOTED, PT EVAL
decreased robert/increased time in double stance/decreased velocity of limb motion/decreased step length/decreased stride length/decreased weight-shifting ability

## 2021-04-09 NOTE — PHYSICAL THERAPY INITIAL EVALUATION ADULT - PLANNED THERAPY INTERVENTIONS, PT EVAL
Stair goal: pt will ascend/descend 15 steps (I) with unilateral handrail via step-to-step method in 2 weeks./balance training/bed mobility training/gait training/strengthening/transfer training

## 2021-04-09 NOTE — PHYSICAL THERAPY INITIAL EVALUATION ADULT - PERTINENT HX OF CURRENT PROBLEM, REHAB EVAL
84 y/o F with PMH of HTN, HLD, OA s/p left hip total replacement (2015), Breast Cancer S/P chemotherapy and bilateral mastectomies (2017)Reports having pain in the R hip radiating down to the upper thigh. Occasionally used cane with amb. Pt now s/p R THR on 4/9/21.

## 2021-04-09 NOTE — PRE-ANESTHESIA EVALUATION ADULT - NSANTHNECKRD_ENT_A_CORE
Subjective:      Patient ID: Adrienne Beckwith is a 5 y.o. male. HPI  Hyun Hutchinson No

## 2021-04-10 LAB
ANION GAP SERPL CALC-SCNC: 8 MMOL/L — SIGNIFICANT CHANGE UP (ref 5–17)
BUN SERPL-MCNC: 10 MG/DL — SIGNIFICANT CHANGE UP (ref 7–23)
CALCIUM SERPL-MCNC: 8.4 MG/DL — SIGNIFICANT CHANGE UP (ref 8.4–10.5)
CHLORIDE SERPL-SCNC: 106 MMOL/L — SIGNIFICANT CHANGE UP (ref 96–108)
CO2 SERPL-SCNC: 24 MMOL/L — SIGNIFICANT CHANGE UP (ref 22–31)
COVID-19 SPIKE DOMAIN AB INTERP: POSITIVE
COVID-19 SPIKE DOMAIN ANTIBODY RESULT: >250 U/ML — HIGH
CREAT SERPL-MCNC: 0.55 MG/DL — SIGNIFICANT CHANGE UP (ref 0.5–1.3)
GLUCOSE SERPL-MCNC: 109 MG/DL — HIGH (ref 70–99)
HCT VFR BLD CALC: 30.5 % — LOW (ref 34.5–45)
HGB BLD-MCNC: 9.7 G/DL — LOW (ref 11.5–15.5)
MCHC RBC-ENTMCNC: 29.7 PG — SIGNIFICANT CHANGE UP (ref 27–34)
MCHC RBC-ENTMCNC: 31.8 GM/DL — LOW (ref 32–36)
MCV RBC AUTO: 93.3 FL — SIGNIFICANT CHANGE UP (ref 80–100)
NRBC # BLD: 0 /100 WBCS — SIGNIFICANT CHANGE UP (ref 0–0)
PLATELET # BLD AUTO: 188 K/UL — SIGNIFICANT CHANGE UP (ref 150–400)
POTASSIUM SERPL-MCNC: 4.3 MMOL/L — SIGNIFICANT CHANGE UP (ref 3.5–5.3)
POTASSIUM SERPL-SCNC: 4.3 MMOL/L — SIGNIFICANT CHANGE UP (ref 3.5–5.3)
RBC # BLD: 3.27 M/UL — LOW (ref 3.8–5.2)
RBC # FLD: 13.4 % — SIGNIFICANT CHANGE UP (ref 10.3–14.5)
SARS-COV-2 IGG+IGM SERPL QL IA: >250 U/ML — HIGH
SARS-COV-2 IGG+IGM SERPL QL IA: POSITIVE
SODIUM SERPL-SCNC: 138 MMOL/L — SIGNIFICANT CHANGE UP (ref 135–145)
WBC # BLD: 13.62 K/UL — HIGH (ref 3.8–10.5)
WBC # FLD AUTO: 13.62 K/UL — HIGH (ref 3.8–10.5)

## 2021-04-10 RX ADMIN — CELECOXIB 200 MILLIGRAM(S): 200 CAPSULE ORAL at 05:52

## 2021-04-10 RX ADMIN — SODIUM CHLORIDE 3 MILLILITER(S): 9 INJECTION INTRAMUSCULAR; INTRAVENOUS; SUBCUTANEOUS at 13:32

## 2021-04-10 RX ADMIN — Medication 101.6 MILLIGRAM(S): at 05:56

## 2021-04-10 RX ADMIN — Medication 975 MILLIGRAM(S): at 12:40

## 2021-04-10 RX ADMIN — SODIUM CHLORIDE 1000 MILLILITER(S): 9 INJECTION, SOLUTION INTRAVENOUS at 06:21

## 2021-04-10 RX ADMIN — CELECOXIB 200 MILLIGRAM(S): 200 CAPSULE ORAL at 17:04

## 2021-04-10 RX ADMIN — Medication 1000 MILLIGRAM(S): at 06:28

## 2021-04-10 RX ADMIN — PANTOPRAZOLE SODIUM 40 MILLIGRAM(S): 20 TABLET, DELAYED RELEASE ORAL at 05:52

## 2021-04-10 RX ADMIN — SODIUM CHLORIDE 3 MILLILITER(S): 9 INJECTION INTRAMUSCULAR; INTRAVENOUS; SUBCUTANEOUS at 05:50

## 2021-04-10 RX ADMIN — ANASTROZOLE 1 MILLIGRAM(S): 1 TABLET ORAL at 11:52

## 2021-04-10 RX ADMIN — Medication 975 MILLIGRAM(S): at 22:43

## 2021-04-10 RX ADMIN — POLYETHYLENE GLYCOL 3350 17 GRAM(S): 17 POWDER, FOR SOLUTION ORAL at 22:43

## 2021-04-10 RX ADMIN — SIMVASTATIN 20 MILLIGRAM(S): 20 TABLET, FILM COATED ORAL at 22:43

## 2021-04-10 RX ADMIN — Medication 325 MILLIGRAM(S): at 17:04

## 2021-04-10 RX ADMIN — Medication 975 MILLIGRAM(S): at 11:52

## 2021-04-10 RX ADMIN — CELECOXIB 200 MILLIGRAM(S): 200 CAPSULE ORAL at 06:29

## 2021-04-10 RX ADMIN — Medication 50 MICROGRAM(S): at 05:52

## 2021-04-10 RX ADMIN — Medication 325 MILLIGRAM(S): at 05:52

## 2021-04-10 RX ADMIN — Medication 100 MILLIGRAM(S): at 00:47

## 2021-04-10 RX ADMIN — Medication 975 MILLIGRAM(S): at 22:52

## 2021-04-10 RX ADMIN — Medication 75 MILLIGRAM(S): at 08:42

## 2021-04-10 RX ADMIN — Medication 400 MILLIGRAM(S): at 05:52

## 2021-04-10 RX ADMIN — CELECOXIB 200 MILLIGRAM(S): 200 CAPSULE ORAL at 17:38

## 2021-04-10 RX ADMIN — SODIUM CHLORIDE 3 MILLILITER(S): 9 INJECTION INTRAMUSCULAR; INTRAVENOUS; SUBCUTANEOUS at 22:42

## 2021-04-10 NOTE — PROGRESS NOTE ADULT - SUBJECTIVE AND OBJECTIVE BOX
Post op Day [1]    Patient resting without complaints.  No chest pain, SOB, N/V.    T(C): 36.6 (04-10-21 @ 04:45), Max: 36.8 (04-09-21 @ 13:00)  HR: 67 (04-10-21 @ 04:45) (59 - 86)  BP: 113/61 (04-10-21 @ 04:45) (89/59 - 114/57)  RR: 18 (04-10-21 @ 04:45) (16 - 18)  SpO2: 96% (04-10-21 @ 04:45) (96% - 100%)  Wt(kg): --    Exam:  Alert and Oriented, No Acute Distress  Lower Ext: RLE hip dressing in place, C/D/I  Calves Soft, Non-tender bilaterally  +PF/DF/EHL/FHL  +DP Pulse  SILT                            9.7    13.62 )-----------( 188      ( 10 Apr 2021 06:59 )             30.5            A/P: 85y Female s/p R Total Hip Arthroplasty.  VSS. NAD.    PT/OT- WBAT RLE, OOB when tolerated  DVT PPx with  mg BID  Followup AM labs  Pain Control    Summer Ding PA-C  Team Pager: #[phone] Post op Day [1]    Patient resting without complaints.  No chest pain, SOB, N/V.    T(C): 36.6 (04-10-21 @ 04:45), Max: 36.8 (04-09-21 @ 13:00)  HR: 67 (04-10-21 @ 04:45) (59 - 86)  BP: 113/61 (04-10-21 @ 04:45) (89/59 - 114/57)  RR: 18 (04-10-21 @ 04:45) (16 - 18)  SpO2: 96% (04-10-21 @ 04:45) (96% - 100%)  Wt(kg): --    Exam:  Alert and Oriented, No Acute Distress  Lower Ext: RLE hip dressing in place, C/D/I  Calves Soft, Non-tender bilaterally  +PF/DF/EHL/FHL  +DP Pulse  SILT                            9.7    13.62 )-----------( 188      ( 10 Apr 2021 06:59 )             30.5            A/P: 85y Female s/p R Total Hip Arthroplasty.  VSS. NAD.    PT/OT- WBAT RLE, OOB when tolerated  DVT PPx with  mg BID  Followup AM labs  Pain Control    Summer Ding PA-C  Team Pager: #[phone]          Patient is a 85y old  Female Right Total Hip Replacement       Agree with  Physician's Assistant note:    Patient comfortable  No complaints    PHYSICAL EXAM:  NAD, Alert        Physical therapy. Patient is ambulating, progressing with ROM, sitting comfortably.     Patient lives alone with stairs at her home.       Plan for Disposition:  Rehabilitation       Balwinder Vuong MD  [facility]  [phone]

## 2021-04-11 ENCOUNTER — TRANSCRIPTION ENCOUNTER (OUTPATIENT)
Age: 86
End: 2021-04-11

## 2021-04-11 LAB — SARS-COV-2 RNA SPEC QL NAA+PROBE: SIGNIFICANT CHANGE UP

## 2021-04-11 RX ADMIN — Medication 50 MICROGRAM(S): at 06:32

## 2021-04-11 RX ADMIN — SODIUM CHLORIDE 3 MILLILITER(S): 9 INJECTION INTRAMUSCULAR; INTRAVENOUS; SUBCUTANEOUS at 06:32

## 2021-04-11 RX ADMIN — SODIUM CHLORIDE 3 MILLILITER(S): 9 INJECTION INTRAMUSCULAR; INTRAVENOUS; SUBCUTANEOUS at 13:12

## 2021-04-11 RX ADMIN — SODIUM CHLORIDE 3 MILLILITER(S): 9 INJECTION INTRAMUSCULAR; INTRAVENOUS; SUBCUTANEOUS at 21:56

## 2021-04-11 RX ADMIN — Medication 975 MILLIGRAM(S): at 06:32

## 2021-04-11 RX ADMIN — SIMVASTATIN 20 MILLIGRAM(S): 20 TABLET, FILM COATED ORAL at 21:59

## 2021-04-11 RX ADMIN — Medication 975 MILLIGRAM(S): at 22:29

## 2021-04-11 RX ADMIN — Medication 325 MILLIGRAM(S): at 17:05

## 2021-04-11 RX ADMIN — Medication 975 MILLIGRAM(S): at 21:59

## 2021-04-11 RX ADMIN — Medication 75 MILLIGRAM(S): at 09:23

## 2021-04-11 RX ADMIN — Medication 975 MILLIGRAM(S): at 13:11

## 2021-04-11 RX ADMIN — Medication 325 MILLIGRAM(S): at 06:32

## 2021-04-11 RX ADMIN — Medication 975 MILLIGRAM(S): at 13:43

## 2021-04-11 RX ADMIN — CELECOXIB 200 MILLIGRAM(S): 200 CAPSULE ORAL at 17:05

## 2021-04-11 RX ADMIN — CELECOXIB 200 MILLIGRAM(S): 200 CAPSULE ORAL at 06:32

## 2021-04-11 RX ADMIN — CELECOXIB 200 MILLIGRAM(S): 200 CAPSULE ORAL at 17:36

## 2021-04-11 RX ADMIN — ANASTROZOLE 1 MILLIGRAM(S): 1 TABLET ORAL at 11:04

## 2021-04-11 RX ADMIN — POLYETHYLENE GLYCOL 3350 17 GRAM(S): 17 POWDER, FOR SOLUTION ORAL at 21:59

## 2021-04-11 RX ADMIN — PANTOPRAZOLE SODIUM 40 MILLIGRAM(S): 20 TABLET, DELAYED RELEASE ORAL at 06:32

## 2021-04-11 RX ADMIN — LISINOPRIL 40 MILLIGRAM(S): 2.5 TABLET ORAL at 06:32

## 2021-04-11 RX ADMIN — CELECOXIB 200 MILLIGRAM(S): 200 CAPSULE ORAL at 06:31

## 2021-04-11 NOTE — OCCUPATIONAL THERAPY INITIAL EVALUATION ADULT - PERTINENT HX OF CURRENT PROBLEM, REHAB EVAL
85 year old female with past medical history of HTN, HLD, OA s/p left hip total replacement (2015), Breast Cancer S/P chemotherapy and bilateral mastectomies (2017) See below

## 2021-04-11 NOTE — DISCHARGE NOTE PROVIDER - NSDCFUADDINST_GEN_ALL_CORE_FT
Please keep dressing clean and intact.  To be removed in office on follow up visit and incision care as needed in office.    Please continue taking your Aspirin as prescribed for a total of 6 weeks and then resume your daily baby aspirin.    Weight Bearing As Tolerated RLE with posterior hip precautions.   PT-weight bearing as tolerated Right lower extremity with posterior hip precautions.  Keep dressing clean, dry and intact.  Please take Aspirin 325 twice daily x 6 weeks total for dvt prevention, then resume your home aspirin regimen.  Have doctor remove any staples/sutures postop day 14 (if applicable), and apply steristrips as needed.

## 2021-04-11 NOTE — DISCHARGE NOTE PROVIDER - NSDCFUADDAPPT_GEN_ALL_CORE_FT
Please follow up with Dr. Vuong as scheduled on 4/27/21.  Please call to confirm your appointment time.    Please follow up with your primary care provider in 4 weeks from hospital discharge for continuum of care and medication adjustment as needed Please follow up with Dr. Vuong as scheduled on 4/27/21.  Please call to confirm your appointment time.  Please call to change appointment if you are still in rehab.    Please follow up with your primary care provider in 4 weeks from hospital discharge for continuum of care and medication adjustment as needed

## 2021-04-11 NOTE — CHART NOTE - NSCHARTNOTEFT_GEN_A_CORE
Spoke to patient and she states she received the Moderna vaccine in Feb 2021       Sari Wang PA-C   Beeper    [phone]

## 2021-04-11 NOTE — DISCHARGE NOTE PROVIDER - CARE PROVIDER_API CALL
Balwinder Vuong)  Orthopaedic Surgery  [address], [address]  [place] [address]  Phone: [phone]  Fax: [phone]  Follow Up Time:

## 2021-04-11 NOTE — OCCUPATIONAL THERAPY INITIAL EVALUATION ADULT - LIVES WITH, PROFILE
Pt lives alone in condo, 15 steps to enter, tub in bathroom. Pt I in ADLs and ambulation prior to admission

## 2021-04-11 NOTE — DISCHARGE NOTE PROVIDER - HOSPITAL COURSE
Patient is an 84 y/o F with PMHx of HTN, Breast Cancer s/p Mastectomy/Chemo and a L Total Hip Arthroplasty(2015) admitted to [facility] on 4/9/21 for an elective R Total Hip Arthroplasty with Dr. Vuong.  Patient tolerated procedure well with no complications.  Patient evaluated by physical therapy who recommended patient for Sub Acute Rehab for disposition.  Remainder of hospital stay unremarkable and patient medically cleared and wojciech for discharge to rehab. Reason for Admission	" I'm having my Right hip done"  Goal for Surgery:  Pain relief     History of Present Illness:  History of Present Illness	  This is a 85 year old female with past medical history of HTN, HLD, OA s/p left hip total replacement (2015), Breast Cancer S/P chemotherapy and bilateral mastectomies (2017)  Reports having pain in the Right hip radiating down to the upper thigh for months. Occasionally uses cane for support during ambulation. Pt is now presenting to [facility] for scheduled Right total hip replacement on 4/9/21 with Dr. Vuong. Pt advised to obtain medical clearance     **Covid test on  4/6/21 @ [facility]    Allergies/Medications:   Allergies:        Allergies:  	No Known Allergies:     PMH/PSH/FH/SH:    Past Medical, Past Surgical, and Family History:  PAST MEDICAL HISTORY:  Breast cancer left treated with lumpectomy and radiation in 2002  HLD (hyperlipidemia)   HTN (hypertension)   OA (osteoarthritis).     PAST SURGICAL HISTORY:  H/O breast biopsy multiple all benign, except for the one in 2009  H/O total hysterectomy 2020  History of bilateral mastectomy 2017  History of left hip replacement 2015  S/P breast lumpectomy left breast with sentinal node biopsy 2009.      Patient is an 86 y/o F with PMHx of HTN, Breast Cancer s/p Mastectomy/Chemo and a L Total Hip Arthroplasty(2015) admitted to [facility] on 4/9/21 for an elective R Total Hip Arthroplasty with Dr. Vuong.  Patient tolerated procedure well with no complications.  Patient evaluated by physical therapy who recommended patient for Sub Acute Rehab for disposition.  Remainder of hospital stay unremarkable and patient medically cleared and wojciech for discharge to rehab.

## 2021-04-11 NOTE — OCCUPATIONAL THERAPY INITIAL EVALUATION ADULT - PRECAUTIONS/LIMITATIONS, REHAB EVAL
posterior precautions/right hip precautions posterior precautions/fall precautions/right hip precautions

## 2021-04-11 NOTE — DISCHARGE NOTE PROVIDER - NSDCMRMEDTOKEN_GEN_ALL_CORE_FT
anastrozole 1 mg oral tablet: 1 tab(s) orally once a day  Wanda Aspirin: 81 milligram(s) orally once a day  Calcium 500+D oral tablet, chewable: 1 tab(s) orally 2 times a day  levothyroxine 50 mcg (0.05 mg) oral capsule: 1 cap(s) orally once a day  Lopressor: 75 milligram(s) orally 2 times a day  Multiple Vitamins oral tablet: 1 tab(s) orally once a day  oscal 1 po 2xday:     ramipril 10 mg oral capsule: 1 cap(s) orally once a day  Zocor 20 mg oral tablet: 1 tab(s) orally once a day (at bedtime)   acetaminophen 325 mg oral tablet: 3 tab(s) orally every 8 hours  anastrozole 1 mg oral tablet: 1 tab(s) orally once a day  aspirin 325 mg oral tablet: 1 tab(s) orally 2 times a day x 6 weeks total for dvt prevention  Calcium 500+D oral tablet, chewable: 1 tab(s) orally 2 times a day  celecoxib 200 mg oral capsule: 1 cap(s) orally every 12 hours x 10 days  levothyroxine 50 mcg (0.05 mg) oral capsule: 1 cap(s) orally once a day  Lopressor: 75 milligram(s) orally 2 times a day  Multiple Vitamins oral tablet: 1 tab(s) orally once a day  oscal 1 po 2xday:     oxyCODONE 5 mg oral tablet: 0.5-1 tab(s) orally every 4 hours, As needed, Moderate to Severe Pain  ramipril 10 mg oral capsule: 1 cap(s) orally once a day  traMADol 50 mg oral tablet: 1 tab(s) orally every 6 hours, As needed, Mild Pain (1 - 3)  Zocor 20 mg oral tablet: 1 tab(s) orally once a day (at bedtime)

## 2021-04-11 NOTE — PROGRESS NOTE ADULT - SUBJECTIVE AND OBJECTIVE BOX
Post op Day [2]    Patient resting without complaints.  No chest pain, SOB, N/V.    T(C): 36.8 (04-11-21 @ 05:35), Max: 37 (04-10-21 @ 16:06)  HR: 69 (04-11-21 @ 05:35) (69 - 91)  BP: 156/78 (04-11-21 @ 05:35) (109/65 - 156/78)  RR: 18 (04-11-21 @ 05:35) (18 - 18)  SpO2: 96% (04-11-21 @ 05:35) (95% - 97%)      Exam:  Alert and Oriented, No Acute Distress  Lower Extremities: R Hip  Dressing: C/D/I w/ Aquacel  Calves Soft, Non-tender bilaterally  +PF/DF/EHL/FHL  SILT  +DP Pulse                              9.7    13.62 )-----------( 188      ( 10 Apr 2021 06:59 )             30.5    04-10    138  |  106  |  10  ----------------------------<  109<H>  4.3   |  24  |  0.55    Ca    8.4      10 Apr 2021 06:51

## 2021-04-12 ENCOUNTER — TRANSCRIPTION ENCOUNTER (OUTPATIENT)
Age: 86
End: 2021-04-12

## 2021-04-12 VITALS — HEART RATE: 91 BPM | DIASTOLIC BLOOD PRESSURE: 69 MMHG | SYSTOLIC BLOOD PRESSURE: 107 MMHG

## 2021-04-12 PROCEDURE — 73501 X-RAY EXAM HIP UNI 1 VIEW: CPT

## 2021-04-12 PROCEDURE — 97530 THERAPEUTIC ACTIVITIES: CPT

## 2021-04-12 PROCEDURE — 80048 BASIC METABOLIC PNL TOTAL CA: CPT

## 2021-04-12 PROCEDURE — 86769 SARS-COV-2 COVID-19 ANTIBODY: CPT

## 2021-04-12 PROCEDURE — 97161 PT EVAL LOW COMPLEX 20 MIN: CPT

## 2021-04-12 PROCEDURE — U0003: CPT

## 2021-04-12 PROCEDURE — 97116 GAIT TRAINING THERAPY: CPT

## 2021-04-12 PROCEDURE — U0005: CPT

## 2021-04-12 PROCEDURE — 97110 THERAPEUTIC EXERCISES: CPT

## 2021-04-12 PROCEDURE — C1713: CPT

## 2021-04-12 PROCEDURE — 85027 COMPLETE CBC AUTOMATED: CPT

## 2021-04-12 PROCEDURE — 82962 GLUCOSE BLOOD TEST: CPT

## 2021-04-12 PROCEDURE — C9803: CPT

## 2021-04-12 PROCEDURE — 88311 DECALCIFY TISSUE: CPT

## 2021-04-12 PROCEDURE — 88305 TISSUE EXAM BY PATHOLOGIST: CPT

## 2021-04-12 PROCEDURE — C1776: CPT

## 2021-04-12 PROCEDURE — 72170 X-RAY EXAM OF PELVIS: CPT

## 2021-04-12 RX ORDER — ACETAMINOPHEN 500 MG
3 TABLET ORAL
Qty: 0 | Refills: 0 | DISCHARGE
Start: 2021-04-12

## 2021-04-12 RX ORDER — ASPIRIN/CALCIUM CARB/MAGNESIUM 324 MG
1 TABLET ORAL
Qty: 0 | Refills: 0 | DISCHARGE
Start: 2021-04-12

## 2021-04-12 RX ORDER — ASPIRIN/CALCIUM CARB/MAGNESIUM 324 MG
81 TABLET ORAL
Qty: 0 | Refills: 0 | DISCHARGE

## 2021-04-12 RX ORDER — CELECOXIB 200 MG/1
1 CAPSULE ORAL
Qty: 0 | Refills: 0 | DISCHARGE
Start: 2021-04-12

## 2021-04-12 RX ORDER — OXYCODONE HYDROCHLORIDE 5 MG/1
1 TABLET ORAL
Qty: 0 | Refills: 0 | DISCHARGE
Start: 2021-04-12

## 2021-04-12 RX ORDER — TRAMADOL HYDROCHLORIDE 50 MG/1
1 TABLET ORAL
Qty: 0 | Refills: 0 | DISCHARGE
Start: 2021-04-12

## 2021-04-12 RX ADMIN — Medication 325 MILLIGRAM(S): at 06:00

## 2021-04-12 RX ADMIN — Medication 975 MILLIGRAM(S): at 12:46

## 2021-04-12 RX ADMIN — SODIUM CHLORIDE 3 MILLILITER(S): 9 INJECTION INTRAMUSCULAR; INTRAVENOUS; SUBCUTANEOUS at 05:23

## 2021-04-12 RX ADMIN — Medication 975 MILLIGRAM(S): at 06:00

## 2021-04-12 RX ADMIN — Medication 975 MILLIGRAM(S): at 07:26

## 2021-04-12 RX ADMIN — Medication 50 MICROGRAM(S): at 06:00

## 2021-04-12 RX ADMIN — Medication 975 MILLIGRAM(S): at 12:45

## 2021-04-12 RX ADMIN — CELECOXIB 200 MILLIGRAM(S): 200 CAPSULE ORAL at 07:26

## 2021-04-12 RX ADMIN — ANASTROZOLE 1 MILLIGRAM(S): 1 TABLET ORAL at 12:46

## 2021-04-12 RX ADMIN — CELECOXIB 200 MILLIGRAM(S): 200 CAPSULE ORAL at 06:00

## 2021-04-12 RX ADMIN — PANTOPRAZOLE SODIUM 40 MILLIGRAM(S): 20 TABLET, DELAYED RELEASE ORAL at 06:00

## 2021-04-12 RX ADMIN — LISINOPRIL 40 MILLIGRAM(S): 2.5 TABLET ORAL at 06:00

## 2021-04-12 RX ADMIN — SODIUM CHLORIDE 3 MILLILITER(S): 9 INJECTION INTRAMUSCULAR; INTRAVENOUS; SUBCUTANEOUS at 12:38

## 2021-04-12 NOTE — PROGRESS NOTE ADULT - ASSESSMENT
86 y/o F s/p R MARGARITA, no acute distress, not c/o pain, vitals are stable.       Plan:     -DVT ppx: 325mg ASA PO BID, venodynes     -Pain control: 975mg Tylenol PO q8hrs, 2.5mg Oxycodone q3hrs for moderate pain, 5mg Oxycodone q3hrs for severe pain, Tramadol 50mg q6hrs for mild pain      -WBAT, PT consult      -postop labs- CBC, BMP     -continue to monitor vitals       LIZZY Sánchez-S  Orthopedic beeper [id number]  
A/p: 85y Female POD#2 s/p R Total Hip Arthroplasty.  VSS. NAD.    PT/OT-WBAT RLE with posterior hip precautions  IS  DVT PPx: ASA 325mg PO BID and SCDs  Pain Control  Continue Current Tx.  Dispo planning: PT recommended home however patient and family requesting Sub Acute Rehab    Erasmo Rubio PA-C  Orthopedic Surgery Team  Team Pager: #[phone]
S/P R THR    Plan    OOB/PT/WBAT  D/C planning- KUMAR Wang PA-C   Beeper    [id number]

## 2021-04-12 NOTE — PROGRESS NOTE ADULT - SUBJECTIVE AND OBJECTIVE BOX
Patient is a 85y old  Female who presents with a chief complaint of R Total Hip Arthroplasty (11 Apr 2021 11:16)      POST OPERATIVE DAY #:  3  Patient comfortable  No complaints    VS:  T(C): 36.6 (04-12-21 @ 04:03), Max: 36.8 (04-11-21 @ 08:49)  T(F): 97.9 (04-12-21 @ 04:03), Max: 98.3 (04-11-21 @ 08:49)  HR: 68 (04-12-21 @ 04:03) (68 - 76)  BP: 118/68 (04-12-21 @ 04:03) (95/62 - 123/73)  RR: 18 (04-12-21 @ 04:03) (18 - 18)  SpO2: 97% (04-12-21 @ 04:03) (96% - 98%)  Wt(kg): --      PHYSICAL EXAM:  NAD, Alert  EXT:    Rt Hip Aquacel Dressing C/D/I  abduction pillow in place  No Calf Tenderness  (+) DF/PF; (+) Distal Pulses;  Sensation: No gross deficits noted  Pulses 2+   B/L, PAS     LABS:                        9.7<L>  13.62<H> )-----------( 188      ( 10 Apr 2021 06:59 )             30.5<L>    04-10    138  |  106  |  10  ----------------------------<  109<H>  4.3   |  24  |  0.55

## 2021-04-27 ENCOUNTER — APPOINTMENT (OUTPATIENT)
Dept: ORTHOPEDIC SURGERY | Facility: CLINIC | Age: 86
End: 2021-04-27
Payer: MEDICARE

## 2021-04-27 PROCEDURE — 99024 POSTOP FOLLOW-UP VISIT: CPT

## 2021-04-27 PROCEDURE — 72170 X-RAY EXAM OF PELVIS: CPT

## 2021-04-27 NOTE — HISTORY OF PRESENT ILLNESS
[Clean/Dry/Intact] : clean, dry and intact [Healed] : healed [Neuro Intact] : an unremarkable neurological exam [Vascular Intact] : ~T peripheral vascular exam normal [Negative Ashish's] : maneuvers demonstrated a negative Ashish's sign [Doing Well] : is doing well [Excellent Pain Control] : has excellent pain control [Staples Removed] : staples were removed [Other Location: e.g. School (Enter Location, City,State)___] : at [unfilled], at the time of the visit. [Other Location: e.g. Home (Enter Location, City,State)___] : at [unfilled] [Verbal consent obtained from patient] : the patient, [unfilled] [Chills] : no chills [Constipation] : no constipation [Diarrhea] : no diarrhea [Dysuria] : no dysuria [Fever] : no fever [Nausea] : no nausea [Vomiting] : no vomiting [Erythema] : not erythematous [Discharge] : absent of discharge [Swelling] : not swollen [Dehiscence] : not dehisced [de-identified] : Ms. ANIL ZAVALA is a 85 year female who returns to office status post right THR 4/9/21.\par Level of pain on scale of 1-10 is 0 out of 10.\par Home care PT has been going very well overall.\par Not taking any pain medicine.\par Taking Aspirin for DVT prophylaxis.\par Denies fever, chills, nausea, vomiting, constipation, diarrhea, incision site infection/drainage.\par All review of systems, family history, social history, surgical history, past medical history, medications, and allergies not previously stated as positive are negative. They were reviewed by me today with the patient and documented accordingly. [de-identified] : s/p right THR 4/9/21. [de-identified] : Her wound is healing very well the staples are removed.  She is walking well she has the ability to ambulate with and without a some support.  She easily can flex to 95 degrees. [de-identified] : She is doing extremely well and will return in 6 weeks for follow-up. [de-identified] : An AP of the pelvis and a lateral of the patient's right hip shows a DJ O porous total hip replacement in good position and well fixed.

## 2021-05-01 ENCOUNTER — NON-APPOINTMENT (OUTPATIENT)
Age: 86
End: 2021-05-01

## 2021-05-02 LAB — SURGICAL PATHOLOGY STUDY: SIGNIFICANT CHANGE UP

## 2021-06-07 ENCOUNTER — APPOINTMENT (OUTPATIENT)
Dept: ORTHOPEDIC SURGERY | Facility: CLINIC | Age: 86
End: 2021-06-07
Payer: MEDICARE

## 2021-06-07 VITALS
HEART RATE: 82 BPM | BODY MASS INDEX: 24.11 KG/M2 | SYSTOLIC BLOOD PRESSURE: 125 MMHG | HEIGHT: 66 IN | DIASTOLIC BLOOD PRESSURE: 76 MMHG | WEIGHT: 150 LBS

## 2021-06-07 DIAGNOSIS — Z96.642 PRESENCE OF LEFT ARTIFICIAL HIP JOINT: ICD-10-CM

## 2021-06-07 DIAGNOSIS — Z96.641 PRESENCE OF RIGHT ARTIFICIAL HIP JOINT: ICD-10-CM

## 2021-06-07 PROCEDURE — 72170 X-RAY EXAM OF PELVIS: CPT

## 2021-06-07 PROCEDURE — 99024 POSTOP FOLLOW-UP VISIT: CPT

## 2021-06-07 NOTE — HISTORY OF PRESENT ILLNESS
[de-identified] : s/p right THR 4/9/21. [de-identified] : Ms. ANIL ZAVALA is a 85 year female who returns to office status post right THR 4/9/21.\par Level of pain on scale of 1-10 is 0 out of 10.\par Outpatient PT has been going very well overall.\par Denies fever, chills, nausea, vomiting, constipation, diarrhea, incision site infection/drainage.\par All review of systems, family history, social history, surgical history, past medical history, medications, and allergies not previously stated as positive are negative. They were reviewed by me today with the patient and documented accordingly. [de-identified] : Wound is well-healed.  She is walking very well.  She easily flexes 115 degrees and has full extension.  She is walking well and full weightbearing.  She still uses a walker or support for balance. [de-identified] : AP of the pelvis and a lateral of the right hip shows bilateral total hip replacements there porous prosthesis is the new right total hip replacement is a DJ O hip in good position and well fixed. [de-identified] : Return visit in 4 to 6 months

## 2021-07-08 ENCOUNTER — FORM ENCOUNTER (OUTPATIENT)
Age: 86
End: 2021-07-08

## 2021-09-02 ENCOUNTER — APPOINTMENT (OUTPATIENT)
Dept: SURGERY | Facility: CLINIC | Age: 86
End: 2021-09-02
Payer: MEDICARE

## 2021-09-02 VITALS
SYSTOLIC BLOOD PRESSURE: 150 MMHG | DIASTOLIC BLOOD PRESSURE: 87 MMHG | OXYGEN SATURATION: 96 % | WEIGHT: 157 LBS | TEMPERATURE: 98.2 F | BODY MASS INDEX: 25.23 KG/M2 | HEART RATE: 90 BPM | HEIGHT: 66 IN

## 2021-09-02 DIAGNOSIS — K43.6 OTHER AND UNSPECIFIED VENTRAL HERNIA WITH OBSTRUCTION, W/OUT GANGRENE: ICD-10-CM

## 2021-09-02 PROCEDURE — 99203 OFFICE O/P NEW LOW 30 MIN: CPT

## 2021-09-21 ENCOUNTER — APPOINTMENT (OUTPATIENT)
Dept: SURGERY | Facility: CLINIC | Age: 86
End: 2021-09-21
Payer: MEDICARE

## 2021-09-21 VITALS
BODY MASS INDEX: 23.54 KG/M2 | SYSTOLIC BLOOD PRESSURE: 146 MMHG | HEART RATE: 90 BPM | OXYGEN SATURATION: 97 % | DIASTOLIC BLOOD PRESSURE: 80 MMHG | HEIGHT: 67 IN | WEIGHT: 150 LBS | TEMPERATURE: 98.3 F

## 2021-09-21 PROCEDURE — 99213 OFFICE O/P EST LOW 20 MIN: CPT

## 2021-09-29 ENCOUNTER — OUTPATIENT (OUTPATIENT)
Dept: OUTPATIENT SERVICES | Facility: HOSPITAL | Age: 86
LOS: 1 days | Discharge: HOME | End: 2021-09-29

## 2021-09-29 DIAGNOSIS — Z90.13 ACQUIRED ABSENCE OF BILATERAL BREASTS AND NIPPLES: Chronic | ICD-10-CM

## 2021-09-29 DIAGNOSIS — Z98.89 OTHER SPECIFIED POSTPROCEDURAL STATES: Chronic | ICD-10-CM

## 2021-09-29 DIAGNOSIS — Z90.710 ACQUIRED ABSENCE OF BOTH CERVIX AND UTERUS: Chronic | ICD-10-CM

## 2021-09-29 DIAGNOSIS — Z96.642 PRESENCE OF LEFT ARTIFICIAL HIP JOINT: Chronic | ICD-10-CM

## 2021-09-29 DIAGNOSIS — Z23 ENCOUNTER FOR IMMUNIZATION: ICD-10-CM

## 2021-09-29 RX ORDER — SIMVASTATIN 20 MG/1
20 TABLET, FILM COATED ORAL
Qty: 30 | Refills: 0 | Status: DISCONTINUED | COMMUNITY
Start: 2017-08-16 | End: 2021-09-29

## 2021-09-29 RX ORDER — LEVOTHYROXINE SODIUM 0.05 MG/1
50 TABLET ORAL
Refills: 0 | Status: ACTIVE | COMMUNITY
Start: 2021-09-29

## 2021-09-29 RX ORDER — SULFAMETHOXAZOLE AND TRIMETHOPRIM 800; 160 MG/1; MG/1
800-160 TABLET ORAL
Qty: 10 | Refills: 0 | Status: DISCONTINUED | COMMUNITY
Start: 2017-11-30 | End: 2021-09-29

## 2021-09-29 RX ORDER — CELECOXIB 200 MG/1
200 CAPSULE ORAL DAILY
Qty: 30 | Refills: 2 | Status: DISCONTINUED | COMMUNITY
Start: 2020-09-01 | End: 2021-09-29

## 2021-09-29 RX ORDER — RAMIPRIL 10 MG/1
10 CAPSULE ORAL
Refills: 0 | Status: ACTIVE | COMMUNITY
Start: 2017-12-05

## 2021-09-29 RX ORDER — CEFDINIR 300 MG/1
300 CAPSULE ORAL
Qty: 14 | Refills: 0 | Status: DISCONTINUED | COMMUNITY
Start: 2017-06-21 | End: 2021-09-29

## 2021-10-01 ENCOUNTER — APPOINTMENT (OUTPATIENT)
Dept: INFUSION THERAPY | Facility: HOSPITAL | Age: 86
End: 2021-10-01

## 2021-10-01 ENCOUNTER — APPOINTMENT (OUTPATIENT)
Dept: HEMATOLOGY ONCOLOGY | Facility: CLINIC | Age: 86
End: 2021-10-01
Payer: MEDICARE

## 2021-10-01 VITALS
HEART RATE: 74 BPM | RESPIRATION RATE: 16 BRPM | OXYGEN SATURATION: 97 % | WEIGHT: 160.93 LBS | DIASTOLIC BLOOD PRESSURE: 83 MMHG | BODY MASS INDEX: 26.18 KG/M2 | HEIGHT: 65.75 IN | SYSTOLIC BLOOD PRESSURE: 131 MMHG | TEMPERATURE: 98.1 F

## 2021-10-01 DIAGNOSIS — Z80.1 FAMILY HISTORY OF MALIGNANT NEOPLASM OF TRACHEA, BRONCHUS AND LUNG: ICD-10-CM

## 2021-10-01 DIAGNOSIS — Z72.89 OTHER PROBLEMS RELATED TO LIFESTYLE: ICD-10-CM

## 2021-10-01 DIAGNOSIS — Z80.3 FAMILY HISTORY OF MALIGNANT NEOPLASM OF BREAST: ICD-10-CM

## 2021-10-01 PROCEDURE — 99204 OFFICE O/P NEW MOD 45 MIN: CPT

## 2021-10-04 PROBLEM — Z80.1 FAMILY HISTORY OF LUNG CANCER: Status: ACTIVE | Noted: 2021-10-04

## 2021-10-04 PROBLEM — Z80.3 FAMILY HISTORY OF BREAST CANCER: Status: ACTIVE | Noted: 2021-10-04

## 2021-10-04 NOTE — HISTORY OF PRESENT ILLNESS
[Disease: _____________________] : Disease: [unfilled] [AJCC Stage: ____] : AJCC Stage: [unfilled] [de-identified] : 86 year old female initially diagnosed in 2009 and  2017 with b/l breast cancers who is transferring her care from [facility] to [facility].\par \par Ms. 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 Larsen at that time\par \par 2017 - noted to have an elevated CEA which led to a PET/CT scan which revealed a right breast lesions.  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 Schumacher performed b/l mastectomies and sentinel lymph node excisions. Final Path is as follows:\par Right Breast Mastectomy Specimen: 1.1 cm IDC Grade 3 with focal micropappilary features; One negative SN; clear margins; no lymphovascular invasion\par Left Breast Mastectomy Specimen: focal IDC grade 2-3 1.75 MM in greatest dimension; no lymphovascular invasion; Surgical margins negative\par \par Treatment Protocol:\par Adjuvant Chemotherapy:  Herceptin/Taxol - patient completed 30 weeks of trastuzumab therapy; Herceptin was stopped prematurely because of decereased EF which subsequently resolved\par Adjuvant Endocrine Tx:  Anastrozole started September 8, 2017 \par \par HEALTH MAINTENANCE:\par PCP:   Dr. Michael Reynolds  November 2020 - f/up scheduled for 2021\par Gyn:  Dr. Milton Chen\par Orthopedic Surgeon: Dr. Vuong\par Breast Surgeon: Dr. Schumacher\par \par Genetic Testing: MyRisk Negative 2017 3/22/2017\par Colonoscopy: 8/2016\par Bone Density:  1/2020 - Normal BD \par \par \par \par  [de-identified] : IDC  [de-identified] : see below [de-identified] : 2017\par B/L breast cnacer \par Right Breast Her2+ Stage I\par Left Breast  ER+/ Her 2 negative - Stage I\par \par 2009 DCIS Right Breast  [de-identified] : 10/1/21\par Patient presents today to rule out metastatic breast cancer and assess treatment toxicity for history of b/l breast cancer.\par Patient denies any SOB, CP, abdominal pain, bone pain or headache.\par Patient denies any hotflashes, arthralgias, vaginal dryness, vaginal bleeding, hair loss, muscle cramps.\par \par

## 2021-10-04 NOTE — END OF VISIT
[FreeTextEntry3] : Dr. Cabrales was present for this visit and advised on primary plan of care for this patient.\par  [Time Spent: ___ minutes] : I have spent [unfilled] minutes of time on the encounter.

## 2021-10-04 NOTE — REVIEW OF SYSTEMS
[Patient Intake Form Reviewed] : Patient intake form was reviewed [Negative] : Allergic/Immunologic [FreeTextEntry2] : weight gain 5 lbs

## 2021-10-04 NOTE — ASSESSMENT
[FreeTextEntry1] : 86 year old female with history of bilateral breast cancer in 2017 : Right breast Stage IA IDC:  ER negative/Her2 positive (1.1 cm)  Left breast Stage IA IDC:  ER Positive/Her 2 negative (1.75 mm); Patient completed chemotherapy and 30 weeks of trastuzumab based therapy.  She has been on endocrine therapy -anastrozole since 9/8/17.\par Remote Hx DCIS 2009\par \par - BIJU x 4 years \par - tolerating anastrozole 1 mg daily very well - continue current therapy\par - Plan for 5 years of AI therapy through 9/2022\par - bone density reviewed and within normal \par - continue Vitamin D3\par - patient to have bw from PCP sent to our office after her visit 11/2021.\par - f/up in 6 months or sooner if necessary\par \par \par

## 2021-10-04 NOTE — PHYSICAL EXAM
[Fully active, able to carry on all pre-disease performance without restriction] : Status 0 - Fully active, able to carry on all pre-disease performance without restriction [Normal] : affect appropriate [de-identified] : b/l mastectomy without reconstruction; no chest wall masses, bilateral axilla without adenopathy  [de-identified] : diastasis recti

## 2021-10-13 ENCOUNTER — NON-APPOINTMENT (OUTPATIENT)
Age: 86
End: 2021-10-13

## 2021-12-14 ENCOUNTER — NON-APPOINTMENT (OUTPATIENT)
Age: 86
End: 2021-12-14

## 2021-12-15 ENCOUNTER — APPOINTMENT (OUTPATIENT)
Dept: ORTHOPEDIC SURGERY | Facility: CLINIC | Age: 86
End: 2021-12-15
Payer: MEDICARE

## 2021-12-15 PROCEDURE — 99213 OFFICE O/P EST LOW 20 MIN: CPT

## 2021-12-15 PROCEDURE — 73522 X-RAY EXAM HIPS BI 3-4 VIEWS: CPT

## 2021-12-15 NOTE — HISTORY OF PRESENT ILLNESS
[de-identified] : Patient is s/p right THR 4/9/21.\par Her right hip is doing very well at this time.\par No pain or complaints about the right hip.\par She is able to do her ADLs without any major issue.\par She is also s/p left THR 1/9/15, which is doing very well today with no issues or complaints.

## 2021-12-15 NOTE — DISCUSSION/SUMMARY
[de-identified] : Patient is doing very well at this time as far as her total hip replacements she will return for follow-up in 6 months to a year.

## 2021-12-15 NOTE — PHYSICAL EXAM
[de-identified] : This patient is now doing extremely well with both of her hips.  She is walking well.  She has no significant hip pain.  She has slight stiffness when first getting up.  Her examination shows she has a flexion of 120 degrees right hip 120 degrees left hip, abduction right hip 60 left hip 60, adduction right hip 10 left hip 10, external rotation right hip 60 left hip 60 and internal rotation right hip 10 left hip 10. [de-identified] : An AP of the pelvis and lateral right left hip shows a left hip is normal porous Biomet metal on plastic total hip replacement the right hip is a DJ O ceramic against polyethylene articulation both are in excellent position and well fixed the femoral heads are in the center of the acetabulum and there is no osteolysis.

## 2022-03-07 ENCOUNTER — RX RENEWAL (OUTPATIENT)
Age: 87
End: 2022-03-07

## 2022-04-25 ENCOUNTER — OUTPATIENT (OUTPATIENT)
Dept: OUTPATIENT SERVICES | Facility: HOSPITAL | Age: 87
LOS: 1 days | Discharge: HOME | End: 2022-04-25

## 2022-04-25 DIAGNOSIS — Z96.642 PRESENCE OF LEFT ARTIFICIAL HIP JOINT: Chronic | ICD-10-CM

## 2022-04-25 DIAGNOSIS — Z90.710 ACQUIRED ABSENCE OF BOTH CERVIX AND UTERUS: Chronic | ICD-10-CM

## 2022-04-25 DIAGNOSIS — Z98.89 OTHER SPECIFIED POSTPROCEDURAL STATES: Chronic | ICD-10-CM

## 2022-04-25 DIAGNOSIS — Z90.13 ACQUIRED ABSENCE OF BILATERAL BREASTS AND NIPPLES: Chronic | ICD-10-CM

## 2022-04-25 DIAGNOSIS — Z23 ENCOUNTER FOR IMMUNIZATION: ICD-10-CM

## 2022-04-28 ENCOUNTER — RESULT REVIEW (OUTPATIENT)
Age: 87
End: 2022-04-28

## 2022-04-28 ENCOUNTER — APPOINTMENT (OUTPATIENT)
Dept: HEMATOLOGY ONCOLOGY | Facility: CLINIC | Age: 87
End: 2022-04-28
Payer: MEDICARE

## 2022-04-28 VITALS
HEART RATE: 80 BPM | OXYGEN SATURATION: 96 % | BODY MASS INDEX: 26.35 KG/M2 | TEMPERATURE: 97.3 F | HEIGHT: 65.75 IN | RESPIRATION RATE: 16 BRPM | DIASTOLIC BLOOD PRESSURE: 84 MMHG | SYSTOLIC BLOOD PRESSURE: 142 MMHG | WEIGHT: 162.04 LBS

## 2022-04-28 DIAGNOSIS — Z79.811 LONG TERM (CURRENT) USE OF AROMATASE INHIBITORS: ICD-10-CM

## 2022-04-28 LAB
25(OH)D3 SERPL-MCNC: 42 NG/ML
ALBUMIN SERPL ELPH-MCNC: 4.5 G/DL
ALP BLD-CCNC: 105 U/L
ALT SERPL-CCNC: 18 U/L
ANION GAP SERPL CALC-SCNC: 12 MMOL/L
AST SERPL-CCNC: 24 U/L
BASOPHILS # BLD AUTO: 0.06 K/UL — SIGNIFICANT CHANGE UP (ref 0–0.2)
BASOPHILS NFR BLD AUTO: 0.6 % — SIGNIFICANT CHANGE UP (ref 0–2)
BILIRUB SERPL-MCNC: 0.3 MG/DL
BUN SERPL-MCNC: 17 MG/DL
CALCIUM SERPL-MCNC: 9.7 MG/DL
CHLORIDE SERPL-SCNC: 104 MMOL/L
CO2 SERPL-SCNC: 26 MMOL/L
COVID-19 SPIKE DOMAIN ANTIBODY INTERPRETATION: POSITIVE
CREAT SERPL-MCNC: 0.62 MG/DL
EGFR: 88 ML/MIN/1.73M2
EOSINOPHIL # BLD AUTO: 0.12 K/UL — SIGNIFICANT CHANGE UP (ref 0–0.5)
EOSINOPHIL NFR BLD AUTO: 1.2 % — SIGNIFICANT CHANGE UP (ref 0–6)
GLUCOSE SERPL-MCNC: 88 MG/DL
HCT VFR BLD CALC: 40.7 % — SIGNIFICANT CHANGE UP (ref 34.5–45)
HGB BLD-MCNC: 13.2 G/DL — SIGNIFICANT CHANGE UP (ref 11.5–15.5)
IMM GRANULOCYTES NFR BLD AUTO: 0.2 % — SIGNIFICANT CHANGE UP (ref 0–1.5)
LYMPHOCYTES # BLD AUTO: 2.57 K/UL — SIGNIFICANT CHANGE UP (ref 1–3.3)
LYMPHOCYTES # BLD AUTO: 25.6 % — SIGNIFICANT CHANGE UP (ref 13–44)
MCHC RBC-ENTMCNC: 30.3 PG — SIGNIFICANT CHANGE UP (ref 27–34)
MCHC RBC-ENTMCNC: 32.4 G/DL — SIGNIFICANT CHANGE UP (ref 32–36)
MCV RBC AUTO: 93.6 FL — SIGNIFICANT CHANGE UP (ref 80–100)
MONOCYTES # BLD AUTO: 0.77 K/UL — SIGNIFICANT CHANGE UP (ref 0–0.9)
MONOCYTES NFR BLD AUTO: 7.7 % — SIGNIFICANT CHANGE UP (ref 2–14)
NEUTROPHILS # BLD AUTO: 6.51 K/UL — SIGNIFICANT CHANGE UP (ref 1.8–7.4)
NEUTROPHILS NFR BLD AUTO: 64.7 % — SIGNIFICANT CHANGE UP (ref 43–77)
NRBC # BLD: 0 /100 WBCS — SIGNIFICANT CHANGE UP (ref 0–0)
PLATELET # BLD AUTO: 255 K/UL — SIGNIFICANT CHANGE UP (ref 150–400)
POTASSIUM SERPL-SCNC: 5.4 MMOL/L
PROT SERPL-MCNC: 7.5 G/DL
RBC # BLD: 4.35 M/UL — SIGNIFICANT CHANGE UP (ref 3.8–5.2)
RBC # FLD: 13.8 % — SIGNIFICANT CHANGE UP (ref 10.3–14.5)
SARS-COV-2 AB SERPL IA-ACNC: >250 U/ML
SODIUM SERPL-SCNC: 142 MMOL/L
WBC # BLD: 10.05 K/UL — SIGNIFICANT CHANGE UP (ref 3.8–10.5)
WBC # FLD AUTO: 10.05 K/UL — SIGNIFICANT CHANGE UP (ref 3.8–10.5)

## 2022-04-28 PROCEDURE — 99214 OFFICE O/P EST MOD 30 MIN: CPT

## 2022-05-04 PROBLEM — Z79.811 USE OF ANASTROZOLE: Status: ACTIVE | Noted: 2021-10-01

## 2022-05-04 NOTE — ASSESSMENT
[FreeTextEntry1] : 86 year old female with history of bilateral breast cancer in 2017 : Right breast Stage IA IDC:  ER negative/Her2 positive (1.1 cm)  Left breast Stage IA IDC:  ER Positive/Her 2 negative (1.75 mm); Patient completed chemotherapy and 30 weeks of trastuzumab based therapy.  She has been on endocrine therapy -anastrozole since 9/8/17.\par Remote Hx DCIS 2009\par \par - tolerating anastrozole 1 mg daily, tolerating well.  Plan for 5 years of AI therapy through 9/2022\par - clinically BIJU\par - continue to follow up with breast surgeon as recommended\par - labs today\par - f/up in 6 months or sooner if necessary\par \par \par

## 2022-05-04 NOTE — HISTORY OF PRESENT ILLNESS
[Disease: _____________________] : Disease: [unfilled] [AJCC Stage: ____] : AJCC Stage: [unfilled] [de-identified] : Ms. 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 Larsen at that time\par \par 2017 - noted to have an elevated CEA which led to a PET/CT scan which revealed a right breast lesions.  Subsequent workup included a Breast MRI which disclosed a 9 x 8 x10 mm in right breast at 3 o'clock; 5 mm nodule in left retroareolar area.\par \par Core Biopsy results were as follows:\par Right Breast 2 o'clock - IDC grade 3 with associated chronic inflammatory infiltrates and microcalcification;  ER/CO negative; Her 2 IHC 3+\par Left Breast retroareolar area:  microinvasive ductal carcinoma grade 2: ER 99%; CO 25%; Her 2 not done due to insufficient tissues; KI67 - 10%\par \par January 2017 - Dr. Schumacher performed b/l mastectomies and sentinel lymph node excisions. Final Path is as follows:\par Right Breast Mastectomy Specimen: 1.1 cm IDC Grade 3 with focal micropappilary features; One negative SN; clear margins; no lymphovascular invasion\par Left Breast Mastectomy Specimen: focal IDC grade 2-3 1.75 MM in greatest dimension; no lymphovascular invasion; Surgical margins negative\par \par Treatment Protocol:\par Adjuvant Chemotherapy:  Herceptin/Taxol - patient completed 30 weeks of trastuzumab therapy; Herceptin was stopped prematurely because of decreased EF which subsequently resolved\par Adjuvant Endocrine Tx:  Anastrozole started September 8, 2017 \par \par Genetic Testing: Unruly Negative 2017 3/22/2017 [de-identified] : IDC  [de-identified] : see below [de-identified] : 2017\par B/L breast cnacer \par Right Breast Her2+ Stage I\par Left Breast  ER+/ Her 2 negative - Stage I\par \par 2009 DCIS Right Breast  [de-identified] : \par On anastrazole, tolerating well. Plan for 5 years of AI therapy through 9/2022\par Denies chest wall masses or skin changes\par Breast Surgeon: Dr. Schumacher, seeing in June\par Labs today\par \par HEALTH MAINTENANCE:\par PCP:   Dr. Michael Reynolds  sees yearly in November \par Gyn:  Dr. Milton Chen, no vaginal bleeding or spotting\par Orthopedic Surgeon: Dr. Vuong\par Colonoscopy: 8/2016\par GI Dr. Khan - midline hernia, no repair, monitoring, no pain\par Bone Density:  1/2020 - Normal BD \par URO Dr. Person for dropped bladder\par s/p COVID vaccine, s/p COVID booster x 1\par  passed away 1/2021

## 2022-05-04 NOTE — PHYSICAL EXAM
[Fully active, able to carry on all pre-disease performance without restriction] : Status 0 - Fully active, able to carry on all pre-disease performance without restriction [Normal] : affect appropriate [de-identified] : b/l mastectomy without reconstruction; no chest wall masses, bilateral axilla without adenopathy  [de-identified] : diastasis recti

## 2022-07-25 ENCOUNTER — APPOINTMENT (OUTPATIENT)
Dept: SURGERY | Facility: CLINIC | Age: 87
End: 2022-07-25

## 2022-07-25 VITALS
HEART RATE: 92 BPM | DIASTOLIC BLOOD PRESSURE: 76 MMHG | HEIGHT: 65 IN | SYSTOLIC BLOOD PRESSURE: 165 MMHG | WEIGHT: 160 LBS | OXYGEN SATURATION: 96 % | TEMPERATURE: 97.1 F | BODY MASS INDEX: 26.66 KG/M2

## 2022-07-25 DIAGNOSIS — M62.08 SEPARATION OF MUSCLE (NONTRAUMATIC), OTHER SITE: ICD-10-CM

## 2022-07-25 DIAGNOSIS — K41.90 UNILATERAL FEMORAL HERNIA, W/OUT OBSTRUCTION OR GANGRENE, NOT SPECIFIED AS RECURRENT: ICD-10-CM

## 2022-07-25 PROCEDURE — 99213 OFFICE O/P EST LOW 20 MIN: CPT

## 2022-08-08 NOTE — H&P PST ADULT - WEIGHT IN LBS
Health Maintenance Due   Topic Date Due   • Hepatitis B Vaccine (1 of 3 - 3-dose series) Never done   • Shingles Vaccine (1 of 2) Never done   • COVID-19 Vaccine (4 - Booster for Moderna series) 03/15/2022       Patient is due for topics as listed above but is not proceeding with Immunization(s) COVID-19, Hep B and Shingles at this time.      149.9

## 2022-11-30 ENCOUNTER — OUTPATIENT (OUTPATIENT)
Dept: OUTPATIENT SERVICES | Facility: HOSPITAL | Age: 87
LOS: 1 days | Discharge: HOME | End: 2022-11-30

## 2022-11-30 DIAGNOSIS — Z98.89 OTHER SPECIFIED POSTPROCEDURAL STATES: Chronic | ICD-10-CM

## 2022-11-30 DIAGNOSIS — Z96.642 PRESENCE OF LEFT ARTIFICIAL HIP JOINT: Chronic | ICD-10-CM

## 2022-11-30 DIAGNOSIS — D64.9 ANEMIA, UNSPECIFIED: ICD-10-CM

## 2022-11-30 DIAGNOSIS — Z90.13 ACQUIRED ABSENCE OF BILATERAL BREASTS AND NIPPLES: Chronic | ICD-10-CM

## 2022-11-30 DIAGNOSIS — Z90.710 ACQUIRED ABSENCE OF BOTH CERVIX AND UTERUS: Chronic | ICD-10-CM

## 2022-12-01 ENCOUNTER — APPOINTMENT (OUTPATIENT)
Dept: HEMATOLOGY ONCOLOGY | Facility: CLINIC | Age: 87
End: 2022-12-01

## 2022-12-01 VITALS
WEIGHT: 164.22 LBS | DIASTOLIC BLOOD PRESSURE: 77 MMHG | TEMPERATURE: 97.3 F | BODY MASS INDEX: 27.33 KG/M2 | RESPIRATION RATE: 16 BRPM | HEART RATE: 78 BPM | OXYGEN SATURATION: 98 % | SYSTOLIC BLOOD PRESSURE: 136 MMHG

## 2022-12-01 DIAGNOSIS — Z86.16 PERSONAL HISTORY OF COVID-19: ICD-10-CM

## 2022-12-01 DIAGNOSIS — C50.211 MALIGNANT NEOPLASM OF UPPER-INNER QUADRANT OF RIGHT FEMALE BREAST: ICD-10-CM

## 2022-12-01 PROCEDURE — 99213 OFFICE O/P EST LOW 20 MIN: CPT

## 2022-12-01 RX ORDER — ESTRADIOL 0.1 MG/G
0.1 CREAM VAGINAL
Qty: 42 | Refills: 0 | Status: ACTIVE | COMMUNITY
Start: 2022-10-06

## 2022-12-05 PROBLEM — Z86.16 HISTORY OF COVID-19: Status: RESOLVED | Noted: 2022-12-05 | Resolved: 2022-12-05

## 2022-12-05 NOTE — PHYSICAL EXAM
[Fully active, able to carry on all pre-disease performance without restriction] : Status 0 - Fully active, able to carry on all pre-disease performance without restriction [Normal] : affect appropriate [de-identified] : b/l mastectomy without reconstruction; prominent sternal area 5 cm in length; no chest wall masses, bilateral axilla without adenopathy  [de-identified] : diastasis recti  no diplopia/no photophobia

## 2022-12-05 NOTE — HISTORY OF PRESENT ILLNESS
[Disease: _____________________] : Disease: [unfilled] [AJCC Stage: ____] : AJCC Stage: [unfilled] [de-identified] : Ms. 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 Larsen at that time\par \par 2017 - noted to have an elevated CEA which led to a PET/CT scan which revealed a right breast lesions.  Subsequent workup included a Breast MRI which disclosed a 9 x 8 x10 mm in right breast at 3 o'clock; 5 mm nodule in left retroareolar area.\par \par Core Biopsy results were as follows:\par Right Breast 2 o'clock - IDC grade 3 with associated chronic inflammatory infiltrates and microcalcification;  ER/VA negative; Her 2 IHC 3+\par Left Breast retroareolar area:  microinvasive ductal carcinoma grade 2: ER 99%; VA 25%; Her 2 not done due to insufficient tissues; KI67 - 10%\par \par January 2017 - Dr. Schumacher performed b/l mastectomies and sentinel lymph node excisions. Final Path is as follows:\par Right Breast Mastectomy Specimen: 1.1 cm IDC Grade 3 with focal micropappilary features; One negative SN; clear margins; no lymphovascular invasion\par Left Breast Mastectomy Specimen: focal IDC grade 2-3 1.75 MM in greatest dimension; no lymphovascular invasion; Surgical margins negative\par \par Treatment Protocol:\par Adjuvant Chemotherapy:  Herceptin/Taxol - patient completed 30 weeks of trastuzumab therapy; Herceptin was stopped prematurely because of decreased EF which subsequently resolved\par Adjuvant Endocrine Tx:  Anastrozole started September 8, 2017 \par \par Genetic Testing: Unruly Negative 2017 3/22/2017 [de-identified] : IDC  [de-identified] : see below [de-identified] : 2017\par B/L breast cnacer \par Right Breast Her2+ Stage I\par Left Breast  ER+/ Her 2 negative - Stage I\par \par 2009 DCIS Right Breast  [de-identified] : 12/1/2022\par Patient presents today to rule out metastatic breast cancer and assess treatment toxicity.\par On anastrazole, tolerating well. Pt completed 5 years of AI therapy in 9/2022\par Denies chest wall masses or skin changes\par Breast Surgeon: Dr. Schumacher, seeing in June\par Labs today\par COVID 11/1/2022 - no sequela\par \par HEALTH MAINTENANCE:\par PCP:   Dr. Michael Reynolds  sees yearly in November \par CARDIO 11/2022\par Gyn:  Dr. Milton Chen, no vaginal bleeding or spotting; recurrent UTI's using an estradiol cream twice weekly \par Orthopedic Surgeon: Dr. Vuong\par Colonoscopy: 8/2016 - no longer getting colonoscopy \par GI Dr. Khan - midline hernia, no repair, monitoring, no pain\par Bone Density:  1/2020 - Normal BD \par URO Dr. Person for dropped bladder\par s/p COVID vaccine, s/p COVID booster x 1\par  passed away 1/2021

## 2022-12-05 NOTE — ASSESSMENT
[FreeTextEntry1] : 86 year old female with history of bilateral breast cancer in 2017 : Right breast Stage IA IDC:  ER negative/Her2 positive (1.1 cm)  Left breast Stage IA IDC:  ER Positive/Her 2 negative (1.75 mm); Patient completed chemotherapy and 30 weeks of trastuzumab based therapy.  She has been on endocrine therapy -anastrozole since 9/8/17.\par Remote Hx DCIS 2009\par \par - Completed  5 years of AI therapy through 12/2022\par - clinically BIJU\par - sternal prominence may be normal anatomic variant - however, not previously documented; Rec: CT chest no contrast to further evaluate\par - Bone Density due at this time - RX provided\par - labs done with pcp\par - f/up 12 months\par \par \par

## 2022-12-06 ENCOUNTER — NON-APPOINTMENT (OUTPATIENT)
Age: 87
End: 2022-12-06

## 2022-12-06 DIAGNOSIS — R91.8 OTHER NONSPECIFIC ABNORMAL FINDING OF LUNG FIELD: ICD-10-CM

## 2022-12-06 RX ORDER — ANASTROZOLE TABLETS 1 MG/1
1 TABLET ORAL
Qty: 90 | Refills: 1 | Status: DISCONTINUED | COMMUNITY
Start: 2017-10-05 | End: 2022-12-06

## 2023-10-25 ENCOUNTER — OUTPATIENT (OUTPATIENT)
Dept: OUTPATIENT SERVICES | Facility: HOSPITAL | Age: 88
LOS: 1 days | Discharge: HOME | End: 2023-10-25

## 2023-10-25 DIAGNOSIS — Z90.13 ACQUIRED ABSENCE OF BILATERAL BREASTS AND NIPPLES: Chronic | ICD-10-CM

## 2023-10-25 DIAGNOSIS — D64.9 ANEMIA, UNSPECIFIED: ICD-10-CM

## 2023-10-25 DIAGNOSIS — Z98.89 OTHER SPECIFIED POSTPROCEDURAL STATES: Chronic | ICD-10-CM

## 2023-10-25 DIAGNOSIS — Z96.642 PRESENCE OF LEFT ARTIFICIAL HIP JOINT: Chronic | ICD-10-CM

## 2023-10-25 DIAGNOSIS — Z90.710 ACQUIRED ABSENCE OF BOTH CERVIX AND UTERUS: Chronic | ICD-10-CM

## 2023-10-26 ENCOUNTER — APPOINTMENT (OUTPATIENT)
Dept: HEMATOLOGY ONCOLOGY | Facility: CLINIC | Age: 88
End: 2023-10-26
Payer: MEDICARE

## 2023-10-26 VITALS
HEART RATE: 78 BPM | WEIGHT: 164.22 LBS | TEMPERATURE: 97.2 F | BODY MASS INDEX: 27.33 KG/M2 | SYSTOLIC BLOOD PRESSURE: 129 MMHG | OXYGEN SATURATION: 97 % | RESPIRATION RATE: 16 BRPM | DIASTOLIC BLOOD PRESSURE: 78 MMHG

## 2023-10-26 DIAGNOSIS — C50.912 MALIGNANT NEOPLASM OF UNSPECIFIED SITE OF LEFT FEMALE BREAST: ICD-10-CM

## 2023-10-26 DIAGNOSIS — Z17.0 MALIGNANT NEOPLASM OF UNSPECIFIED SITE OF LEFT FEMALE BREAST: ICD-10-CM

## 2023-10-26 PROCEDURE — 99214 OFFICE O/P EST MOD 30 MIN: CPT

## 2023-10-27 PROBLEM — C50.912 INFILTRATING DUCTAL CARCINOMA OF LEFT BREAST, ESTROGEN RECEPTOR POSITIVE, STAGE 1: Status: ACTIVE | Noted: 2021-10-04

## 2023-12-26 ENCOUNTER — APPOINTMENT (OUTPATIENT)
Dept: ORTHOPEDIC SURGERY | Facility: CLINIC | Age: 88
End: 2023-12-26
Payer: MEDICARE

## 2023-12-26 VITALS — HEIGHT: 66 IN | BODY MASS INDEX: 25.71 KG/M2 | WEIGHT: 160 LBS

## 2023-12-26 PROCEDURE — 99213 OFFICE O/P EST LOW 20 MIN: CPT

## 2023-12-26 PROCEDURE — 73522 X-RAY EXAM HIPS BI 3-4 VIEWS: CPT

## 2024-06-19 ENCOUNTER — APPOINTMENT (OUTPATIENT)
Dept: ORTHOPEDIC SURGERY | Facility: CLINIC | Age: 89
End: 2024-06-19
Payer: MEDICARE

## 2024-06-19 DIAGNOSIS — M51.26 OTHER INTERVERTEBRAL DISC DISPLACEMENT, LUMBAR REGION: ICD-10-CM

## 2024-06-19 DIAGNOSIS — M70.61 TROCHANTERIC BURSITIS, RIGHT HIP: ICD-10-CM

## 2024-06-19 DIAGNOSIS — Z96.642 PRESENCE OF LEFT ARTIFICIAL HIP JOINT: ICD-10-CM

## 2024-06-19 DIAGNOSIS — Z96.641 PRESENCE OF RIGHT ARTIFICIAL HIP JOINT: ICD-10-CM

## 2024-06-19 PROCEDURE — 72100 X-RAY EXAM L-S SPINE 2/3 VWS: CPT

## 2024-06-19 PROCEDURE — 20610 DRAIN/INJ JOINT/BURSA W/O US: CPT | Mod: RT

## 2024-06-19 PROCEDURE — 99214 OFFICE O/P EST MOD 30 MIN: CPT | Mod: 25

## 2024-06-19 PROCEDURE — 73522 X-RAY EXAM HIPS BI 3-4 VIEWS: CPT

## 2024-10-19 ENCOUNTER — OUTPATIENT (OUTPATIENT)
Dept: OUTPATIENT SERVICES | Facility: HOSPITAL | Age: 89
LOS: 1 days | Discharge: HOME | End: 2024-10-19

## 2024-10-19 DIAGNOSIS — Z98.89 OTHER SPECIFIED POSTPROCEDURAL STATES: Chronic | ICD-10-CM

## 2024-10-19 DIAGNOSIS — Z90.13 ACQUIRED ABSENCE OF BILATERAL BREASTS AND NIPPLES: Chronic | ICD-10-CM

## 2024-10-19 DIAGNOSIS — Z90.710 ACQUIRED ABSENCE OF BOTH CERVIX AND UTERUS: Chronic | ICD-10-CM

## 2024-10-19 DIAGNOSIS — Z96.642 PRESENCE OF LEFT ARTIFICIAL HIP JOINT: Chronic | ICD-10-CM

## 2024-10-19 DIAGNOSIS — D64.9 ANEMIA, UNSPECIFIED: ICD-10-CM

## 2024-10-24 ENCOUNTER — APPOINTMENT (OUTPATIENT)
Dept: HEMATOLOGY ONCOLOGY | Facility: CLINIC | Age: 89
End: 2024-10-24
Payer: MEDICARE

## 2024-10-24 ENCOUNTER — NON-APPOINTMENT (OUTPATIENT)
Age: 89
End: 2024-10-24

## 2024-10-24 ENCOUNTER — APPOINTMENT (OUTPATIENT)
Dept: HEMATOLOGY ONCOLOGY | Facility: CLINIC | Age: 89
End: 2024-10-24

## 2024-10-24 VITALS
OXYGEN SATURATION: 98 % | HEART RATE: 65 BPM | RESPIRATION RATE: 16 BRPM | SYSTOLIC BLOOD PRESSURE: 123 MMHG | WEIGHT: 165.32 LBS | DIASTOLIC BLOOD PRESSURE: 75 MMHG | BODY MASS INDEX: 26.69 KG/M2 | TEMPERATURE: 97.2 F

## 2024-10-24 DIAGNOSIS — C50.211 MALIGNANT NEOPLASM OF UPPER-INNER QUADRANT OF RIGHT FEMALE BREAST: ICD-10-CM

## 2024-10-24 DIAGNOSIS — C50.912 MALIGNANT NEOPLASM OF UNSPECIFIED SITE OF LEFT FEMALE BREAST: ICD-10-CM

## 2024-10-24 DIAGNOSIS — Z17.0 MALIGNANT NEOPLASM OF UNSPECIFIED SITE OF LEFT FEMALE BREAST: ICD-10-CM

## 2024-10-24 PROCEDURE — G2211 COMPLEX E/M VISIT ADD ON: CPT

## 2024-10-24 PROCEDURE — 99214 OFFICE O/P EST MOD 30 MIN: CPT

## 2025-07-03 ENCOUNTER — APPOINTMENT (OUTPATIENT)
Dept: ORTHOPEDIC SURGERY | Facility: CLINIC | Age: 89
End: 2025-07-03
Payer: MEDICARE

## 2025-07-03 PROCEDURE — 99213 OFFICE O/P EST LOW 20 MIN: CPT

## 2025-07-03 PROCEDURE — 73522 X-RAY EXAM HIPS BI 3-4 VIEWS: CPT
